# Patient Record
Sex: FEMALE | Race: WHITE | NOT HISPANIC OR LATINO | Employment: FULL TIME | URBAN - METROPOLITAN AREA
[De-identification: names, ages, dates, MRNs, and addresses within clinical notes are randomized per-mention and may not be internally consistent; named-entity substitution may affect disease eponyms.]

---

## 2022-05-03 ENCOUNTER — APPOINTMENT (OUTPATIENT)
Dept: WOMENS IMAGING | Facility: HOSPITAL | Age: 42
End: 2022-05-03

## 2022-05-03 PROCEDURE — 77063 BREAST TOMOSYNTHESIS BI: CPT | Performed by: RADIOLOGY

## 2022-05-03 PROCEDURE — 77067 SCR MAMMO BI INCL CAD: CPT | Performed by: RADIOLOGY

## 2022-09-27 ENCOUNTER — HOSPITAL ENCOUNTER (OUTPATIENT)
Dept: GENERAL RADIOLOGY | Facility: HOSPITAL | Age: 42
Discharge: HOME | End: 2022-09-27

## 2022-09-27 ENCOUNTER — OFFICE VISIT (OUTPATIENT)
Dept: INTERNAL MEDICINE | Facility: CLINIC | Age: 42
End: 2022-09-27

## 2022-09-27 ENCOUNTER — TELEPHONE (OUTPATIENT)
Dept: INTERNAL MEDICINE | Facility: CLINIC | Age: 42
End: 2022-09-27

## 2022-09-27 VITALS
HEIGHT: 60 IN | WEIGHT: 192 LBS | BODY MASS INDEX: 37.69 KG/M2 | HEART RATE: 86 BPM | OXYGEN SATURATION: 98 % | DIASTOLIC BLOOD PRESSURE: 72 MMHG | SYSTOLIC BLOOD PRESSURE: 114 MMHG | TEMPERATURE: 97.3 F

## 2022-09-27 DIAGNOSIS — R00.2 PALPITATIONS: Primary | ICD-10-CM

## 2022-09-27 DIAGNOSIS — Z00.00 ENCOUNTER FOR MEDICAL EXAMINATION TO ESTABLISH CARE: ICD-10-CM

## 2022-09-27 DIAGNOSIS — M25.531 RIGHT WRIST PAIN: ICD-10-CM

## 2022-09-27 DIAGNOSIS — Z11.59 NEED FOR HEPATITIS C SCREENING TEST: ICD-10-CM

## 2022-09-27 DIAGNOSIS — R00.2 PALPITATIONS: ICD-10-CM

## 2022-09-27 DIAGNOSIS — Z13.220 SCREENING FOR LIPID DISORDERS: ICD-10-CM

## 2022-09-27 DIAGNOSIS — E03.9 HYPOTHYROIDISM, UNSPECIFIED TYPE: ICD-10-CM

## 2022-09-27 DIAGNOSIS — Z13.1 SCREENING FOR DIABETES MELLITUS: ICD-10-CM

## 2022-09-27 DIAGNOSIS — Z00.00 ANNUAL PHYSICAL EXAM: ICD-10-CM

## 2022-09-27 DIAGNOSIS — M25.572 ACUTE LEFT ANKLE PAIN: ICD-10-CM

## 2022-09-27 DIAGNOSIS — E66.01 MORBID (SEVERE) OBESITY DUE TO EXCESS CALORIES: ICD-10-CM

## 2022-09-27 PROCEDURE — 73610 X-RAY EXAM OF ANKLE: CPT

## 2022-09-27 PROCEDURE — 99213 OFFICE O/P EST LOW 20 MIN: CPT | Performed by: NURSE PRACTITIONER

## 2022-09-27 PROCEDURE — 99386 PREV VISIT NEW AGE 40-64: CPT | Performed by: NURSE PRACTITIONER

## 2022-09-27 PROCEDURE — 73110 X-RAY EXAM OF WRIST: CPT

## 2022-09-27 RX ORDER — VALACYCLOVIR HYDROCHLORIDE 500 MG/1
TABLET, FILM COATED ORAL AS NEEDED
COMMUNITY

## 2022-09-27 RX ORDER — LEVOTHYROXINE SODIUM 0.05 MG/1
50 TABLET ORAL DAILY
COMMUNITY
End: 2023-01-09 | Stop reason: SDUPTHER

## 2022-09-27 NOTE — TELEPHONE ENCOUNTER
Regarding Endocrinology Referral :    Velia Barnhart  is not taking new pt, tried to schedule with someone else, pt wanted us to give you a PCP  Call to see if there was another provider suggested.

## 2022-09-27 NOTE — PROGRESS NOTES
"Chief Complaint  Annual PE    Subjective        Brianne Mendoza presents to [facility]  History of Present Illness    New patient and here for her Annual PE.   Patient recently moved here from [place].   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         Patient also concerned about a Fall on [employer] x 06/2022 and has injured her R wrist and L ankle. Still having pain.     Eats fairly well, movement with  Walking (30/45 minutes a day).   Concerned she is not losing weight like she has in the past.     Hx of palpitations and hypothyroidism.   Referrals placed per patient request.     UTD on dental but not vision.     Objective   Vital Signs:  /72   Pulse 86   Temp 97.3 °F (36.3 °C)   Ht 152.4 cm (60\")   Wt 87.1 kg (192 lb)   SpO2 98%   BMI 37.50 kg/m²   Estimated body mass index is 37.5 kg/m² as calculated from the following:    Height as of this encounter: 152.4 cm (60\").    Weight as of this encounter: 87.1 kg (192 lb).    Class 2 Severe Obesity (BMI >=35 and <=39.9). Obesity-related health conditions include the following: none. Obesity is unchanged. BMI is is above average; BMI management plan is completed. We discussed low calorie, low carb based diet program, portion control, increasing exercise, joining a fitness center or start home based exercise program, Weight Watchers or other Commercial based weight reduction program, decreasing alcohol consumption and an karri-based approach such as [employer] Pal or Lose It.      Physical Exam  Vitals and nursing note reviewed.   Constitutional:       Appearance: Normal appearance. She is obese.   HENT:      Head: Normocephalic.      Right Ear: Tympanic membrane, ear canal and external ear normal. There is no impacted cerumen.      Left Ear: Tympanic membrane, ear canal and external ear normal. There is no impacted cerumen.      Mouth/Throat:      Mouth: Mucous membranes are moist.   Eyes: "      Pupils: Pupils are equal, round, and reactive to light.   Cardiovascular:      Rate and Rhythm: Normal rate and regular rhythm.      Pulses: Normal pulses.      Heart sounds: Normal heart sounds.      Comments: No peripheral edema noted.   Pulmonary:      Effort: Pulmonary effort is normal. No respiratory distress.      Breath sounds: Normal breath sounds. No stridor. No wheezing, rhonchi or rales.   Chest:      Chest wall: No tenderness.   Musculoskeletal:         General: Signs of injury present. Normal range of motion.      Comments: C/o pain with movement/pressure to R wrist and L ankle after a fall x 3 months ago.    Skin:     General: Skin is warm.      Capillary Refill: Capillary refill takes less than 2 seconds.   Neurological:      Mental Status: She is alert and oriented to person, place, and time.   Psychiatric:         Behavior: Behavior normal.        Result Review :                  Assessment and Plan   Diagnoses and all orders for this visit:    1. Palpitations (Primary)  -     Ambulatory Referral to Cardiology  -     Cancel: CBC & Differential; Future  -     Cancel: Comprehensive Metabolic Panel; Future  -     Cancel: Hemoglobin A1c; Future  -     Cancel: Lipid Panel; Future  -     Cancel: TSH Rfx On Abnormal To Free T4; Future  -     TSH Rfx On Abnormal To Free T4; Future  -     Lipid Panel; Future  -     Hemoglobin A1c; Future  -     Comprehensive Metabolic Panel; Future  -     CBC & Differential; Future    2. Annual physical exam  -     Cancel: CBC & Differential; Future  -     Cancel: Comprehensive Metabolic Panel; Future  -     Cancel: Hemoglobin A1c; Future  -     Cancel: Lipid Panel; Future  -     Cancel: TSH Rfx On Abnormal To Free T4; Future  -     TSH Rfx On Abnormal To Free T4; Future  -     Lipid Panel; Future  -     Hemoglobin A1c; Future  -     Comprehensive Metabolic Panel; Future  -     CBC & Differential; Future    3. Hypothyroidism, unspecified type  -     Ambulatory Referral  to Endocrinology  -     Cancel: CBC & Differential; Future  -     Cancel: Comprehensive Metabolic Panel; Future  -     Cancel: Hemoglobin A1c; Future  -     Cancel: Lipid Panel; Future  -     Cancel: TSH Rfx On Abnormal To Free T4; Future  -     TSH Rfx On Abnormal To Free T4; Future  -     Lipid Panel; Future  -     Hemoglobin A1c; Future  -     Comprehensive Metabolic Panel; Future  -     CBC & Differential; Future    4. Right wrist pain  -     XR Wrist 3+ View Right; Future  -     Cancel: CBC & Differential; Future  -     Cancel: Comprehensive Metabolic Panel; Future  -     Cancel: Hemoglobin A1c; Future  -     Cancel: Lipid Panel; Future  -     Cancel: TSH Rfx On Abnormal To Free T4; Future  -     TSH Rfx On Abnormal To Free T4; Future  -     Lipid Panel; Future  -     Hemoglobin A1c; Future  -     Comprehensive Metabolic Panel; Future  -     CBC & Differential; Future    5. Acute left ankle pain  -     XR Ankle 3+ View Left; Future  -     Cancel: CBC & Differential; Future  -     Cancel: Comprehensive Metabolic Panel; Future  -     Cancel: Hemoglobin A1c; Future  -     Cancel: Lipid Panel; Future  -     Cancel: TSH Rfx On Abnormal To Free T4; Future  -     TSH Rfx On Abnormal To Free T4; Future  -     Lipid Panel; Future  -     Hemoglobin A1c; Future  -     Comprehensive Metabolic Panel; Future  -     CBC & Differential; Future    6. Encounter for medical examination to establish care  -     Cancel: CBC & Differential; Future  -     Cancel: Comprehensive Metabolic Panel; Future  -     Cancel: Hemoglobin A1c; Future  -     Cancel: Lipid Panel; Future  -     Cancel: TSH Rfx On Abnormal To Free T4; Future  -     TSH Rfx On Abnormal To Free T4; Future  -     Lipid Panel; Future  -     Hemoglobin A1c; Future  -     Comprehensive Metabolic Panel; Future  -     CBC & Differential; Future    7. Screening for lipid disorders  -     Cancel: CBC & Differential; Future  -     Cancel: Comprehensive Metabolic Panel;  Future  -     Cancel: Hemoglobin A1c; Future  -     Cancel: Lipid Panel; Future  -     Cancel: TSH Rfx On Abnormal To Free T4; Future  -     TSH Rfx On Abnormal To Free T4; Future  -     Lipid Panel; Future  -     Hemoglobin A1c; Future  -     Comprehensive Metabolic Panel; Future  -     CBC & Differential; Future    8. Screening for diabetes mellitus  -     Cancel: CBC & Differential; Future  -     Cancel: Comprehensive Metabolic Panel; Future  -     Cancel: Hemoglobin A1c; Future  -     Cancel: Lipid Panel; Future  -     Cancel: TSH Rfx On Abnormal To Free T4; Future  -     TSH Rfx On Abnormal To Free T4; Future  -     Lipid Panel; Future  -     Hemoglobin A1c; Future  -     Comprehensive Metabolic Panel; Future  -     CBC & Differential; Future    9. Need for hepatitis C screening test  -     Cancel: Hepatitis C antibody; Future  -     Hepatitis C antibody; Future    10. Morbid (severe) obesity due to excess calories (HCC)      Labs today, we will contact her with those results.   She will obtain Xray's of the R wrist and L ankle today and I will call her with those results.   She will schedule her appointments with specialist at this time.   Eye Exam needed.          Follow Up   Return in about 1 year (around 9/27/2023) for Annual physical.  Patient was given instructions and counseling regarding her condition or for health maintenance advice. Please see specific information pulled into the AVS if appropriate.

## 2022-09-28 LAB
ALBUMIN SERPL-MCNC: 4.7 G/DL (ref 3.5–5.2)
ALBUMIN/GLOB SERPL: 2 G/DL
ALP SERPL-CCNC: 90 U/L (ref 39–117)
ALT SERPL-CCNC: 28 U/L (ref 1–33)
AST SERPL-CCNC: 21 U/L (ref 1–32)
BASOPHILS # BLD AUTO: 0.06 10*3/MM3 (ref 0–0.2)
BASOPHILS NFR BLD AUTO: 1.1 % (ref 0–1.5)
BILIRUB SERPL-MCNC: 0.5 MG/DL (ref 0–1.2)
BUN SERPL-MCNC: 11 MG/DL (ref 6–20)
BUN/CREAT SERPL: 15.5 (ref 7–25)
CALCIUM SERPL-MCNC: 9.6 MG/DL (ref 8.6–10.5)
CHLORIDE SERPL-SCNC: 101 MMOL/L (ref 98–107)
CHOLEST SERPL-MCNC: 266 MG/DL (ref 0–200)
CO2 SERPL-SCNC: 27.7 MMOL/L (ref 22–29)
CREAT SERPL-MCNC: 0.71 MG/DL (ref 0.57–1)
EGFRCR SERPLBLD CKD-EPI 2021: 110.4 ML/MIN/1.73
EOSINOPHIL # BLD AUTO: 0.19 10*3/MM3 (ref 0–0.4)
EOSINOPHIL NFR BLD AUTO: 3.6 % (ref 0.3–6.2)
ERYTHROCYTE [DISTWIDTH] IN BLOOD BY AUTOMATED COUNT: 12.6 % (ref 12.3–15.4)
GLOBULIN SER CALC-MCNC: 2.3 GM/DL
GLUCOSE SERPL-MCNC: 88 MG/DL (ref 65–99)
HBA1C MFR BLD: 5.2 % (ref 4.8–5.6)
HCT VFR BLD AUTO: 39.7 % (ref 34–46.6)
HCV AB S/CO SERPL IA: <0.1 S/CO RATIO (ref 0–0.9)
HDLC SERPL-MCNC: 50 MG/DL (ref 40–60)
HGB BLD-MCNC: 13.4 G/DL (ref 12–15.9)
IMM GRANULOCYTES # BLD AUTO: 0.02 10*3/MM3 (ref 0–0.05)
IMM GRANULOCYTES NFR BLD AUTO: 0.4 % (ref 0–0.5)
LDLC SERPL CALC-MCNC: 170 MG/DL (ref 0–100)
LYMPHOCYTES # BLD AUTO: 1.8 10*3/MM3 (ref 0.7–3.1)
LYMPHOCYTES NFR BLD AUTO: 33.7 % (ref 19.6–45.3)
MCH RBC QN AUTO: 31.2 PG (ref 26.6–33)
MCHC RBC AUTO-ENTMCNC: 33.8 G/DL (ref 31.5–35.7)
MCV RBC AUTO: 92.3 FL (ref 79–97)
MONOCYTES # BLD AUTO: 0.3 10*3/MM3 (ref 0.1–0.9)
MONOCYTES NFR BLD AUTO: 5.6 % (ref 5–12)
NEUTROPHILS # BLD AUTO: 2.97 10*3/MM3 (ref 1.7–7)
NEUTROPHILS NFR BLD AUTO: 55.6 % (ref 42.7–76)
NRBC BLD AUTO-RTO: 0 /100 WBC (ref 0–0.2)
PLATELET # BLD AUTO: 266 10*3/MM3 (ref 140–450)
POTASSIUM SERPL-SCNC: 4.5 MMOL/L (ref 3.5–5.2)
PROT SERPL-MCNC: 7 G/DL (ref 6–8.5)
RBC # BLD AUTO: 4.3 10*6/MM3 (ref 3.77–5.28)
SODIUM SERPL-SCNC: 139 MMOL/L (ref 136–145)
TRIGL SERPL-MCNC: 246 MG/DL (ref 0–150)
TSH SERPL DL<=0.005 MIU/L-ACNC: 1.45 UIU/ML (ref 0.27–4.2)
VLDLC SERPL CALC-MCNC: 46 MG/DL (ref 5–40)
WBC # BLD AUTO: 5.34 10*3/MM3 (ref 3.4–10.8)

## 2022-09-28 NOTE — PROGRESS NOTES
Hello, hope you are well today.   I wanted to make sure you are talking about the R wrist that is normal? Thank you, Roslyn.

## 2022-09-30 ENCOUNTER — PATIENT ROUNDING (BHMG ONLY) (OUTPATIENT)
Dept: INTERNAL MEDICINE | Facility: CLINIC | Age: 42
End: 2022-09-30

## 2022-09-30 NOTE — PROGRESS NOTES
September 30, 2022    Hello, may I speak with Brianne Mendoza?    My name is joi    I am  with [facility]  [address]  [place] [address] [phone].    Before we get started may I verify your date of birth? 1980    I am calling to officially welcome you to our practice and ask about your recent visit. Is this a good time to talk? yes  Tell me about your visit with us. What things went well?  everything was good     We're always looking for ways to make our patients' experiences even better. Do you have recommendations on ways we may improve? no   Overall were you satisfied with your first visit to our practice? yes     I appreciate you taking the time to speak with me today. Is there anything else I can do for you? I do not think so thank you     Thank you, and have a great day.

## 2022-11-04 ENCOUNTER — OFFICE VISIT (OUTPATIENT)
Dept: CARDIOLOGY | Facility: CLINIC | Age: 42
End: 2022-11-04

## 2022-11-04 VITALS
HEART RATE: 74 BPM | HEIGHT: 60 IN | OXYGEN SATURATION: 98 % | BODY MASS INDEX: 37.89 KG/M2 | DIASTOLIC BLOOD PRESSURE: 80 MMHG | RESPIRATION RATE: 18 BRPM | WEIGHT: 193 LBS | SYSTOLIC BLOOD PRESSURE: 124 MMHG

## 2022-11-04 DIAGNOSIS — I49.3 PVC (PREMATURE VENTRICULAR CONTRACTION): ICD-10-CM

## 2022-11-04 DIAGNOSIS — R00.2 PALPITATIONS: Primary | ICD-10-CM

## 2022-11-04 DIAGNOSIS — E78.5 HYPERLIPIDEMIA, UNSPECIFIED HYPERLIPIDEMIA TYPE: ICD-10-CM

## 2022-11-04 PROCEDURE — 99204 OFFICE O/P NEW MOD 45 MIN: CPT | Performed by: INTERNAL MEDICINE

## 2022-11-04 PROCEDURE — 93000 ELECTROCARDIOGRAM COMPLETE: CPT | Performed by: INTERNAL MEDICINE

## 2022-11-04 RX ORDER — DILTIAZEM HYDROCHLORIDE 120 MG/1
120 CAPSULE, EXTENDED RELEASE ORAL DAILY
Qty: 90 CAPSULE | Refills: 3 | Status: SHIPPED | OUTPATIENT
Start: 2022-11-04 | End: 2022-12-12

## 2022-11-04 NOTE — PROGRESS NOTES
CARDIOLOGY    Sia Cosme MD    ENCOUNTER DATE:  11/04/2022    Brianne Mendoza / 41 y.o. / female        CHIEF COMPLAINT / REASON FOR OFFICE VISIT     Heart Problem (New Patient: Palpitations )      HISTORY OF PRESENT ILLNESS       HPI    Brianne Mendoza is a 41 y.o. female     This is a nice lady who is being followed in the [facility] in [place]. She was diagnosed with PVCs. She had a burden of up to 6.2% based on one tracing. She was having symptoms of palpitations and getting dizzy and lightheaded. She was placed on diltiazem 120 mg a day and did well on it. She became pregnant and it was recommended that she come off the diltiazem and switch to metoprolol. It sounds like she was evaluated for an ablation, but the electrophysiologist said the palpitations could actually get better during pregnancy and they did. Now she is not taking any medications.     She has some occasional palpitations. She has episodes where she notices her heart is going fast on her Apple watch when she is not doing exertion that should be causing her heart to be in the 100s and that worries her. She was also recently diagnosed with hyperlipidemia. She and her  have been trying to exercise and walk more often and she is not having any exertional issues.       REVIEW OF SYSTEMS     Review of Systems   Constitutional: Negative for chills, fever, weight gain and weight loss.   Cardiovascular: Negative for leg swelling.   Respiratory: Negative for cough, snoring and wheezing.    Hematologic/Lymphatic: Negative for bleeding problem. Does not bruise/bleed easily.   Skin: Negative for color change.   Musculoskeletal: Negative for falls, joint pain and myalgias.   Gastrointestinal: Negative for melena.   Genitourinary: Negative for hematuria.   Neurological: Negative for excessive daytime sleepiness.   Psychiatric/Behavioral: Negative for depression. The patient is nervous/anxious.          VITAL SIGNS     Visit Vitals  BP  "124/80 (BP Location: Right arm, Patient Position: Sitting, Cuff Size: Adult)   Pulse 74   Resp 18   Ht 152.4 cm (60\")   Wt 87.5 kg (193 lb)   SpO2 98%   BMI 37.69 kg/m²         Wt Readings from Last 3 Encounters:   11/04/22 87.5 kg (193 lb)   09/27/22 87.1 kg (192 lb)     Body mass index is 37.69 kg/m².      PHYSICAL EXAMINATION     Constitutional:       General: Not in acute distress.  Neck:      Vascular: No carotid bruit or JVD.   Pulmonary:      Effort: Pulmonary effort is normal.      Breath sounds: Normal breath sounds.   Cardiovascular:      Normal rate. Regular rhythm.      Murmurs: There is no murmur.   Psychiatric:         Mood and Affect: Mood and affect normal.           REVIEWED DATA       ECG 12 Lead    Date/Time: 11/4/2022 8:47 AM  Performed by: Sia Cosme MD  Authorized by: Sia Cosme MD   Comparison: not compared with previous ECG   Previous ECG: no previous ECG available  Rhythm: sinus rhythm  BPM: 74  Conduction: conduction normal  ST Segments: ST segments normal  T Waves: T waves normal    Clinical impression: normal ECG              Lipid Panel    Lipid Panel 9/27/22   Total Cholesterol 266 (A)   Triglycerides 246 (A)   HDL Cholesterol 50   VLDL Cholesterol 46 (A)   LDL Cholesterol  170 (A)   (A) Abnormal value       Comments are available for some flowsheets but are not being displayed.             Lab Results   Component Value Date    GLUCOSE 88 09/27/2022    BUN 11 09/27/2022    CREATININE 0.71 09/27/2022    BCR 15.5 09/27/2022    K 4.5 09/27/2022    CO2 27.7 09/27/2022    CALCIUM 9.6 09/27/2022    PROTENTOTREF 7.0 09/27/2022    ALBUMIN 4.70 09/27/2022    LABIL2 2.0 09/27/2022    AST 21 09/27/2022    ALT 28 09/27/2022       ASSESSMENT & PLAN      Diagnosis Plan   1. Palpitations  Adult Transthoracic Echo Complete W/ Cont if Necessary Per Protocol      2. PVC (premature ventricular contraction)  Adult Transthoracic Echo Complete W/ Cont if Necessary Per Protocol      3. " Hyperlipidemia, unspecified hyperlipidemia type  Adult Transthoracic Echo Complete W/ Cont if Necessary Per Protocol          1.  Occasional PVCs which are symptomatic.  She was previously on Diltiazem and then on metoprolol while she was pregnant.  She is not on anything now but is noticing return of some of the palpitations.  She would like to go back onto the Diltiazem so I have put in a prescription for 120 mg a day and we will see how she does with it.  I also want to recheck an echocardiogram as it has been almost 5 years since her last echocardiogram and she does have these PVCs and was just pregnant.  She will have the echocardiogram when she comes back to follow up with Alessandra in 3 months.     2.  Hyperlipidemia.  I reviewed her lipid panel and I agree that she needs a change of diet and to start more exercise, and recheck the lipids.  I told her that she is in range to require treatment if she is not able to get this under control with diet, exercise and weight loss.    3.  Hypothyroid.  TSH in September was normal.    4.  Anxiety.    She does mention that while she and her  are not trying to get pregnant they are also not trying.  I am not concerned about her being on Diltiazem if she were to become pregnant.      Follow up with Alessandra in 3 months to see how she is doing with the Diltiazem and the palpitations.  She will get an echocardiogram at that time.          Orders Placed This Encounter   Procedures   • ECG 12 Lead     This order was created via procedure documentation     Order Specific Question:   Release to patient     Answer:   Routine Release   • Adult Transthoracic Echo Complete W/ Cont if Necessary Per Protocol     Standing Status:   Future     Standing Expiration Date:   11/4/2023     Order Specific Question:   Reason for exam?     Answer:   Palpitations     Order Specific Question:   Reason for exam?     Answer:   Chest Pain           MEDICATIONS         Discharge Medications           Accurate as of November 4, 2022  8:47 AM. If you have any questions, ask your nurse or doctor.            New Medications      Instructions Start Date   dilTIAZem  MG 24 hr capsule  Commonly known as: DILACOR XR  Started by: Sia Cosme MD   120 mg, Oral, Daily         Continue These Medications      Instructions Start Date   levothyroxine 50 MCG tablet  Commonly known as: SYNTHROID, LEVOTHROID   50 mcg, Oral, Daily      valACYclovir 500 MG tablet  Commonly known as: VALTREX   As Needed               Sia Cosme MD  11/04/22  08:47 EDT    Part of this note may be an electronic transcription/translation of spoken language to printed text using the Dragon dictation system.

## 2022-12-11 ENCOUNTER — PATIENT MESSAGE (OUTPATIENT)
Dept: CARDIOLOGY | Facility: CLINIC | Age: 42
End: 2022-12-11

## 2022-12-12 ENCOUNTER — TELEPHONE (OUTPATIENT)
Dept: CARDIOLOGY | Facility: CLINIC | Age: 42
End: 2022-12-12

## 2022-12-12 RX ORDER — METOPROLOL SUCCINATE 25 MG/1
25 TABLET, EXTENDED RELEASE ORAL DAILY
Qty: 90 TABLET | Refills: 3 | Status: SHIPPED | OUTPATIENT
Start: 2022-12-12

## 2022-12-12 NOTE — TELEPHONE ENCOUNTER
----- Message from Court Polanco MA sent at 12/12/2022  9:49 AM EST -----  Regarding: FW: Beta blocker  Contact: [phone]    ----- Message -----  From: Brianne Mendoza  Sent: 12/11/2022   1:13 PM EST  To: Myla Kasper [facility]  Subject: Beta blocker                                     Hi Dr. Cosme,    I took the channel blocker medication a few times and it didn't feel right to me. Is there anyway I can go back to the beta blocker?     Thanks for your help.

## 2022-12-30 ENCOUNTER — OFFICE VISIT (OUTPATIENT)
Dept: ENDOCRINOLOGY | Age: 42
End: 2022-12-30

## 2022-12-30 ENCOUNTER — PATIENT ROUNDING (BHMG ONLY) (OUTPATIENT)
Dept: ENDOCRINOLOGY | Age: 42
End: 2022-12-30

## 2022-12-30 VITALS
BODY MASS INDEX: 38.87 KG/M2 | HEIGHT: 60 IN | DIASTOLIC BLOOD PRESSURE: 80 MMHG | WEIGHT: 198 LBS | HEART RATE: 80 BPM | SYSTOLIC BLOOD PRESSURE: 110 MMHG | OXYGEN SATURATION: 98 % | TEMPERATURE: 97.7 F

## 2022-12-30 DIAGNOSIS — E03.9 ACQUIRED HYPOTHYROIDISM: Primary | ICD-10-CM

## 2022-12-30 PROCEDURE — 99204 OFFICE O/P NEW MOD 45 MIN: CPT | Performed by: INTERNAL MEDICINE

## 2022-12-30 NOTE — PROGRESS NOTES
"Chief Complaint  Chief Complaint   Patient presents with   • Hypothyroidism     Pt states that energy levels have been good, weight is higher than expected, no family hx of thyroid disease, does have a regular menstrual cycle, last start date was 12.12.22.        Subjective          History of Present Illness    Brianne Mendoza 42 y.o. presents as a new patient for the evaluation of Hypothyroidism. Consulted by  Oksana.     Pt was diagnosed with hypothyroidism about 4 years ago. She was diagnosed with this issue when  She was trying to get pregnant. Starting thyroid medication helped her to conceive in 2021  Takes levothyroxine 50 mcg oral daily, takes on empty stomach.     She reports her energy levels are ok, gained weight of about 25 ponds since summer, planning to start a weight loss program - which is behavior changes. No hair loss, no increased sweating, no dry skin, sleep is ok . c/o heat intolerance and no cold intolerance. No c/o tremors, does c/o racing of heart, does see EP for this ( has PVCs) and no eye symptoms.   Denied c/o difficulty breathing, swallowing and change in voice.   No family hx of thyroid disease.     Menses - LMP - 12/12/2022, she has 1 kid of her own.       Reviewed primary care physician's/consulting physician documentation and lab results         I have reviewed the patient's allergies, medicines, past medical hx, family hx and social hx in detail.    Objective   Vital Signs:   /80   Pulse 80   Temp 97.7 °F (36.5 °C) (Temporal)   Ht 152.4 cm (60\")   Wt 89.8 kg (198 lb)   SpO2 98%   BMI 38.67 kg/m²   Physical Exam   General appearance - no distress  Eyes- anicteric sclera  Ear nose and throat-external ears and nose normal.    Respiratory-normal chest on inspection.  No respiratory distress noted.  Skin-no rashes.  Neuro-alert and oriented x3            Result Review :   The following data was reviewed by: Magnolia Salgado MD on 12/30/2022:  Orders Only on 09/27/2022 "   Component Date Value Ref Range Status   • WBC 09/27/2022 5.34  3.40 - 10.80 10*3/mm3 Final   • RBC 09/27/2022 4.30  3.77 - 5.28 10*6/mm3 Final   • Hemoglobin 09/27/2022 13.4  12.0 - 15.9 g/dL Final   • Hematocrit 09/27/2022 39.7  34.0 - 46.6 % Final   • MCV 09/27/2022 92.3  79.0 - 97.0 fL Final   • MCH 09/27/2022 31.2  26.6 - 33.0 pg Final   • MCHC 09/27/2022 33.8  31.5 - 35.7 g/dL Final   • RDW 09/27/2022 12.6  12.3 - 15.4 % Final   • Platelets 09/27/2022 266  140 - 450 10*3/mm3 Final   • Neutrophil Rel % 09/27/2022 55.6  42.7 - 76.0 % Final   • Lymphocyte Rel % 09/27/2022 33.7  19.6 - 45.3 % Final   • Monocyte Rel % 09/27/2022 5.6  5.0 - 12.0 % Final   • Eosinophil Rel % 09/27/2022 3.6  0.3 - 6.2 % Final   • Basophil Rel % 09/27/2022 1.1  0.0 - 1.5 % Final   • Neutrophils Absolute 09/27/2022 2.97  1.70 - 7.00 10*3/mm3 Final   • Lymphocytes Absolute 09/27/2022 1.80  0.70 - 3.10 10*3/mm3 Final   • Monocytes Absolute 09/27/2022 0.30  0.10 - 0.90 10*3/mm3 Final   • Eosinophils Absolute 09/27/2022 0.19  0.00 - 0.40 10*3/mm3 Final   • Basophils Absolute 09/27/2022 0.06  0.00 - 0.20 10*3/mm3 Final   • Immature Granulocyte Rel % 09/27/2022 0.4  0.0 - 0.5 % Final   • Immature Grans Absolute 09/27/2022 0.02  0.00 - 0.05 10*3/mm3 Final   • nRBC 09/27/2022 0.0  0.0 - 0.2 /100 WBC Final   • Glucose 09/27/2022 88  65 - 99 mg/dL Final   • BUN 09/27/2022 11  6 - 20 mg/dL Final   • Creatinine 09/27/2022 0.71  0.57 - 1.00 mg/dL Final   • EGFR Result 09/27/2022 110.4  >60.0 mL/min/1.73 Final    Comment: National Kidney Foundation and American Society of  Nephrology (ASN) Task Force recommended calculation based  on the Chronic Kidney Disease Epidemiology Collaboration  (CKD-EPI) equation refit without adjustment for race.  GFR Normal >60  Chronic Kidney Disease <60  Kidney Failure <15     • BUN/Creatinine Ratio 09/27/2022 15.5  7.0 - 25.0 Final   • Sodium 09/27/2022 139  136 - 145 mmol/L Final   • Potassium 09/27/2022 4.5  3.5 -  5.2 mmol/L Final   • Chloride 09/27/2022 101  98 - 107 mmol/L Final   • Total CO2 09/27/2022 27.7  22.0 - 29.0 mmol/L Final   • Calcium 09/27/2022 9.6  8.6 - 10.5 mg/dL Final   • Total Protein 09/27/2022 7.0  6.0 - 8.5 g/dL Final   • Albumin 09/27/2022 4.70  3.50 - 5.20 g/dL Final   • Globulin 09/27/2022 2.3  gm/dL Final   • A/G Ratio 09/27/2022 2.0  g/dL Final   • Total Bilirubin 09/27/2022 0.5  0.0 - 1.2 mg/dL Final   • Alkaline Phosphatase 09/27/2022 90  39 - 117 U/L Final   • AST (SGOT) 09/27/2022 21  1 - 32 U/L Final   • ALT (SGPT) 09/27/2022 28  1 - 33 U/L Final   • Hemoglobin A1C 09/27/2022 5.20  4.80 - 5.60 % Final    Comment: Hemoglobin A1C Ranges:  Increased Risk for Diabetes  5.7% to 6.4%  Diabetes                     >= 6.5%  Diabetic Goal                < 7.0%     • Total Cholesterol 09/27/2022 266 (H)  0 - 200 mg/dL Final    Comment: Cholesterol Reference Ranges  (U.S. Department of Health and Human Services ATP III  Classifications)  Desirable          <200 mg/dL  Borderline High    200-239 mg/dL  High Risk          >240 mg/dL  Triglyceride Reference Ranges  (U.S. Department of Health and Human Services ATP III  Classifications)  Normal           <150 mg/dL  Borderline High  150-199 mg/dL  High             200-499 mg/dL  Very High        >500 mg/dL  HDL Reference Ranges  (U.S. Department of Health and Human Services ATP III  Classifications)  Low     <40 mg/dl (major risk factor for CHD)  High    >60 mg/dl ('negative' risk factor for CHD)  LDL Reference Ranges  (U.S. Department of Health and Human Services ATP III  Classifications)  Optimal          <100 mg/dL  Near Optimal     100-129 mg/dL  Borderline High  130-159 mg/dL  High             160-189 mg/dL  Very High        >189 mg/dL     • Triglycerides 09/27/2022 246 (H)  0 - 150 mg/dL Final   • HDL Cholesterol 09/27/2022 50  40 - 60 mg/dL Final   • VLDL Cholesterol Raymundo 09/27/2022 46 (H)  5 - 40 mg/dL Final   • LDL Chol Calc ([facility]) 09/27/2022 170 (H)   0 - 100 mg/dL Final   • TSH 09/27/2022 1.450  0.270 - 4.200 uIU/mL Final   • Hep C Virus Ab 09/27/2022 <0.1  0.0 - 0.9 s/co ratio Final    Comment:                                   Negative:     < 0.8                               Indeterminate: 0.8 - 0.9                                    Positive:     > 0.9   HCV antibody alone does not differentiate between   previous resolved infection and active infection.   The CDC and current clinical guidelines recommend   that a positive HCV antibody result be followed up   with an HCV RNA test to support the diagnosis of   acute HCV infection. [facility] offers Hepatitis C   Virus (HCV) RNA, Diagnosis, DRE (569300) and   Hepatitis C Virus (HCV) Antibody with reflex to   Quantitative Real-time PCR (814175).       Data reviewed: pcp and referral notes       Results Review:    I reviewed the patient's new clinical results.     Assessment and Plan    Problem List Items Addressed This Visit        Other    Hypothyroidism - Primary    Relevant Orders    TSH    T4, Free    T3, Free    Thyroid Peroxidase Antibody    Vitamin B12 & Folate    Vitamin D,25-Hydroxy    TSH    T4, Free    T3, Free    Basic Metabolic Panel    Hemoglobin A1c    Lipid Panel    Vitamin B12 & Folate    Vitamin D,25-Hydroxy     Hypothyroidism-chronic problem  Check thyroid levels  Adjust the dosage of levothyroxine based on the blood work-up  Continue levothyroxine 50 mcg oral daily.    Discussed with the patient to let us know immediately when she gets to know that she is pregnant to further monitor and closely adjusted thyroid levels.    Obesity  Discussed about doing the weight loss program with behavioral changes.  Interpreted the blood work-up/imaging results performed by the primary care/consulting physician -    Refills sent to pharmacy    Follow Up     Patient was given instructions and counseling regarding her condition or for health maintenance advice. Please see specific information pulled into the  "AVS if appropriate.       Thank you for asking me to see your patient, Brianne Mendoza in consultation.         Magnolia Salgado MD  12/30/22      EMR Dragon / transcription disclaimer:     \"Dictated utilizing Dragon dictation\".              "

## 2022-12-31 LAB
25(OH)D3+25(OH)D2 SERPL-MCNC: 22.9 NG/ML (ref 30–100)
FOLATE SERPL-MCNC: 8.48 NG/ML (ref 4.78–24.2)
T3FREE SERPL-MCNC: 2.8 PG/ML (ref 2–4.4)
T4 FREE SERPL-MCNC: 0.96 NG/DL (ref 0.93–1.7)
THYROPEROXIDASE AB SERPL-ACNC: <9 IU/ML (ref 0–34)
TSH SERPL DL<=0.005 MIU/L-ACNC: 2.28 UIU/ML (ref 0.27–4.2)
VIT B12 SERPL-MCNC: 316 PG/ML (ref 211–946)

## 2023-01-09 ENCOUNTER — TELEPHONE (OUTPATIENT)
Dept: ENDOCRINOLOGY | Age: 43
End: 2023-01-09

## 2023-01-09 RX ORDER — LEVOTHYROXINE SODIUM 0.05 MG/1
50 TABLET ORAL DAILY
Qty: 90 TABLET | Refills: 1 | Status: SHIPPED | OUTPATIENT
Start: 2023-01-09 | End: 2023-01-10 | Stop reason: SDUPTHER

## 2023-01-10 DIAGNOSIS — E03.9 ACQUIRED HYPOTHYROIDISM: Primary | ICD-10-CM

## 2023-01-10 RX ORDER — LEVOTHYROXINE SODIUM 0.05 MG/1
50 TABLET ORAL DAILY
Qty: 90 TABLET | Refills: 2 | Status: SHIPPED | OUTPATIENT
Start: 2023-01-10 | End: 2023-01-11 | Stop reason: SDUPTHER

## 2023-01-11 DIAGNOSIS — E03.9 ACQUIRED HYPOTHYROIDISM: Primary | ICD-10-CM

## 2023-01-11 RX ORDER — LEVOTHYROXINE SODIUM 0.05 MG/1
50 TABLET ORAL DAILY
Qty: 90 TABLET | Refills: 2 | Status: SHIPPED | OUTPATIENT
Start: 2023-01-11

## 2023-02-10 ENCOUNTER — TELEPHONE (OUTPATIENT)
Dept: CARDIOLOGY | Facility: CLINIC | Age: 43
End: 2023-02-10

## 2023-02-10 ENCOUNTER — OFFICE VISIT (OUTPATIENT)
Dept: CARDIOLOGY | Facility: CLINIC | Age: 43
End: 2023-02-10
Payer: COMMERCIAL

## 2023-02-10 ENCOUNTER — HOSPITAL ENCOUNTER (OUTPATIENT)
Dept: CARDIOLOGY | Facility: HOSPITAL | Age: 43
Discharge: HOME | End: 2023-02-10
Payer: COMMERCIAL

## 2023-02-10 ENCOUNTER — LAB (OUTPATIENT)
Dept: LAB | Facility: HOSPITAL | Age: 43
End: 2023-02-10
Payer: COMMERCIAL

## 2023-02-10 VITALS
WEIGHT: 190 LBS | DIASTOLIC BLOOD PRESSURE: 60 MMHG | HEIGHT: 60 IN | OXYGEN SATURATION: 98 % | BODY MASS INDEX: 37.3 KG/M2 | HEART RATE: 66 BPM | SYSTOLIC BLOOD PRESSURE: 100 MMHG

## 2023-02-10 DIAGNOSIS — E78.5 HYPERLIPIDEMIA, UNSPECIFIED HYPERLIPIDEMIA TYPE: ICD-10-CM

## 2023-02-10 DIAGNOSIS — I49.3 PVC (PREMATURE VENTRICULAR CONTRACTION): Primary | ICD-10-CM

## 2023-02-10 DIAGNOSIS — E55.9 VITAMIN D DEFICIENCY: ICD-10-CM

## 2023-02-10 DIAGNOSIS — I49.3 PVC (PREMATURE VENTRICULAR CONTRACTION): ICD-10-CM

## 2023-02-10 DIAGNOSIS — R00.2 PALPITATIONS: ICD-10-CM

## 2023-02-10 LAB
25(OH)D3 SERPL-MCNC: 32 NG/ML (ref 30–100)
ALBUMIN SERPL-MCNC: 4.9 G/DL (ref 3.5–5.2)
ALBUMIN/GLOB SERPL: 1.7 G/DL
ALP SERPL-CCNC: 77 U/L (ref 39–117)
ALT SERPL W P-5'-P-CCNC: 31 U/L (ref 1–33)
ANION GAP SERPL CALCULATED.3IONS-SCNC: 7.4 MMOL/L (ref 5–15)
AORTIC ARCH: 2.3 CM
ASCENDING AORTA: 2.7 CM
AST SERPL-CCNC: 20 U/L (ref 1–32)
BH CV ECHO MEAS - ACS: 1.84 CM
BH CV ECHO MEAS - AO MAX PG: 8.1 MMHG
BH CV ECHO MEAS - AO MEAN PG: 4 MMHG
BH CV ECHO MEAS - AO ROOT DIAM: 2.7 CM
BH CV ECHO MEAS - AO V2 MAX: 142 CM/SEC
BH CV ECHO MEAS - AO V2 VTI: 29.5 CM
BH CV ECHO MEAS - AVA(I,D): 1.43 CM2
BH CV ECHO MEAS - EDV(CUBED): 50.9 ML
BH CV ECHO MEAS - EDV(MOD-SP2): 51 ML
BH CV ECHO MEAS - EDV(MOD-SP4): 52 ML
BH CV ECHO MEAS - EF(MOD-BP): 64.5 %
BH CV ECHO MEAS - EF(MOD-SP2): 66.7 %
BH CV ECHO MEAS - EF(MOD-SP4): 65.4 %
BH CV ECHO MEAS - ESV(CUBED): 9.7 ML
BH CV ECHO MEAS - ESV(MOD-SP2): 17 ML
BH CV ECHO MEAS - ESV(MOD-SP4): 18 ML
BH CV ECHO MEAS - FS: 42.5 %
BH CV ECHO MEAS - IVS/LVPW: 1.04 CM
BH CV ECHO MEAS - IVSD: 0.91 CM
BH CV ECHO MEAS - LAT PEAK E' VEL: 13.2 CM/SEC
BH CV ECHO MEAS - LV DIASTOLIC VOL/BSA (35-75): 28 CM2
BH CV ECHO MEAS - LV MASS(C)D: 96.4 GRAMS
BH CV ECHO MEAS - LV MAX PG: 4.2 MMHG
BH CV ECHO MEAS - LV MEAN PG: 2.22 MMHG
BH CV ECHO MEAS - LV SYSTOLIC VOL/BSA (12-30): 9.7 CM2
BH CV ECHO MEAS - LV V1 MAX: 102 CM/SEC
BH CV ECHO MEAS - LV V1 VTI: 19.6 CM
BH CV ECHO MEAS - LVIDD: 3.7 CM
BH CV ECHO MEAS - LVIDS: 2.13 CM
BH CV ECHO MEAS - LVOT AREA: 2.15 CM2
BH CV ECHO MEAS - LVOT DIAM: 1.65 CM
BH CV ECHO MEAS - LVPWD: 0.88 CM
BH CV ECHO MEAS - MED PEAK E' VEL: 12.3 CM/SEC
BH CV ECHO MEAS - MR MAX PG: 28.8 MMHG
BH CV ECHO MEAS - MR MAX VEL: 268.2 CM/SEC
BH CV ECHO MEAS - MV A DUR: 0.11 SEC
BH CV ECHO MEAS - MV A MAX VEL: 74.6 CM/SEC
BH CV ECHO MEAS - MV DEC SLOPE: 292.3 CM/SEC2
BH CV ECHO MEAS - MV DEC TIME: 0.14 MSEC
BH CV ECHO MEAS - MV E MAX VEL: 93.8 CM/SEC
BH CV ECHO MEAS - MV E/A: 1.26
BH CV ECHO MEAS - MV MAX PG: 4.3 MMHG
BH CV ECHO MEAS - MV MEAN PG: 1.78 MMHG
BH CV ECHO MEAS - MV P1/2T: 106.6 MSEC
BH CV ECHO MEAS - MV V2 VTI: 32 CM
BH CV ECHO MEAS - MVA(P1/2T): 2.06 CM2
BH CV ECHO MEAS - MVA(VTI): 1.32 CM2
BH CV ECHO MEAS - PA ACC TIME: 0.14 SEC
BH CV ECHO MEAS - PA PR(ACCEL): 16 MMHG
BH CV ECHO MEAS - PA V2 MAX: 98.8 CM/SEC
BH CV ECHO MEAS - PI END-D VEL: 82.5 CM/SEC
BH CV ECHO MEAS - QP/QS: 0.87
BH CV ECHO MEAS - RAP SYSTOLE: 3 MMHG
BH CV ECHO MEAS - RV MAX PG: 1.4 MMHG
BH CV ECHO MEAS - RV V1 MAX: 59.1 CM/SEC
BH CV ECHO MEAS - RV V1 VTI: 13 CM
BH CV ECHO MEAS - RVOT DIAM: 1.9 CM
BH CV ECHO MEAS - RVSP: 16 MMHG
BH CV ECHO MEAS - SI(MOD-SP2): 18.3 ML/M2
BH CV ECHO MEAS - SI(MOD-SP4): 18.3 ML/M2
BH CV ECHO MEAS - SUP REN AO DIAM: 1.6 CM
BH CV ECHO MEAS - SV(LVOT): 42.1 ML
BH CV ECHO MEAS - SV(MOD-SP2): 34 ML
BH CV ECHO MEAS - SV(MOD-SP4): 34 ML
BH CV ECHO MEAS - SV(RVOT): 36.6 ML
BH CV ECHO MEAS - TAPSE (>1.6): 1.47 CM
BH CV ECHO MEAS - TR MAX PG: 13.1 MMHG
BH CV ECHO MEAS - TR MAX VEL: 181 CM/SEC
BH CV ECHO MEASUREMENTS AVERAGE E/E' RATIO: 7.36
BH CV XLRA - RV BASE: 2.38 CM
BH CV XLRA - RV LENGTH: 6.1 CM
BH CV XLRA - RV MID: 1.67 CM
BH CV XLRA - TDI S': 11.6 CM/SEC
BILIRUB SERPL-MCNC: 0.4 MG/DL (ref 0–1.2)
BUN SERPL-MCNC: 10 MG/DL (ref 6–20)
BUN/CREAT SERPL: 13 (ref 7–25)
CALCIUM SPEC-SCNC: 9.6 MG/DL (ref 8.6–10.5)
CHLORIDE SERPL-SCNC: 103 MMOL/L (ref 98–107)
CHOLEST SERPL-MCNC: 245 MG/DL (ref 0–200)
CO2 SERPL-SCNC: 28.6 MMOL/L (ref 22–29)
CREAT SERPL-MCNC: 0.77 MG/DL (ref 0.57–1)
EGFRCR SERPLBLD CKD-EPI 2021: 99.5 ML/MIN/1.73
GLOBULIN UR ELPH-MCNC: 2.9 GM/DL
GLUCOSE SERPL-MCNC: 92 MG/DL (ref 65–99)
HDLC SERPL-MCNC: 45 MG/DL (ref 40–60)
LDLC SERPL CALC-MCNC: 177 MG/DL (ref 0–100)
LDLC/HDLC SERPL: 3.89 {RATIO}
LEFT ATRIUM VOLUME INDEX: 14 ML/M2
MAXIMAL PREDICTED HEART RATE: 179 BPM
POTASSIUM SERPL-SCNC: 4.3 MMOL/L (ref 3.5–5.2)
PROT SERPL-MCNC: 7.8 G/DL (ref 6–8.5)
SINUS: 2.8 CM
SODIUM SERPL-SCNC: 139 MMOL/L (ref 136–145)
STJ: 1.83 CM
STRESS TARGET HR: 152 BPM
TRIGL SERPL-MCNC: 125 MG/DL (ref 0–150)
VLDLC SERPL-MCNC: 23 MG/DL (ref 5–40)

## 2023-02-10 PROCEDURE — 80061 LIPID PANEL: CPT

## 2023-02-10 PROCEDURE — 93306 TTE W/DOPPLER COMPLETE: CPT

## 2023-02-10 PROCEDURE — 93306 TTE W/DOPPLER COMPLETE: CPT | Performed by: INTERNAL MEDICINE

## 2023-02-10 PROCEDURE — 99214 OFFICE O/P EST MOD 30 MIN: CPT | Performed by: NURSE PRACTITIONER

## 2023-02-10 PROCEDURE — 82306 VITAMIN D 25 HYDROXY: CPT

## 2023-02-10 PROCEDURE — 80053 COMPREHEN METABOLIC PANEL: CPT

## 2023-02-10 PROCEDURE — 36415 COLL VENOUS BLD VENIPUNCTURE: CPT

## 2023-02-10 NOTE — TELEPHONE ENCOUNTER
I spoke with Brianne Mendoza and updated pt on results/recommendations from provider.  Pt verbalized understanding and has no further questions at this time.    Thank you,    Susy Sweet, RN  Triage [facility]  02/10/23 10:18 EST

## 2023-02-10 NOTE — PROGRESS NOTES
Date of Office Visit: 02/10/23  Encounter Provider: MARCOS Michele  Place of Service: [facility]  Patient Name: Brianne Mednoza  :1980    Chief Complaint   Patient presents with   • Hyperlipidemia   • Follow-up   • Palpitations   :     HPI: Brianne Mendoza is a 42 y.o. female  with hyperlipidemia, premature ventricular contractions, hypothyroidism and anxiety.  She has family history of coronary artery disease involving her maternal aunt who had a heart attack in her 60s and her maternal grandmother who had a heart attack in her 70s.      She was previously followed in the [facility] system in [place].  She is now followed by Dr. Sia Cosme and I will visit her for the first time and have reviewed her medical record.    She was found to have a PVC burden of 6.5% in May 2019 and had palpitation and dizziness and lightheadedness.  She was treated with diltiazem 120 mg daily but when she became pregnant she came off diltiazem and switch to metoprolol.  She was evaluated for an ablation but her palpitations got better during pregnancy.  She was seen in 2022 and was not on calcium channel blocker or beta-blocker.  She reported increased palpitations and was started back on diltiazem.  She then sent a [employer] message that she took the calcium channel blocker a few times but did not feel right so she was switched to metoprolol succinate 25 mg daily.  She presents today for reassessment and her palpitations are much improved on metoprolol succinate.  These are very sporadic now.  They were daily.  She has history of light snoring but no excessive sleepiness or morning headaches.  She believes her sleep pattern is fine.  She has been exercising 6 days a week doing running and kickboxing.  She is also adjusted her diet with help to get her cholesterol down.  She is lost over 10 pounds in the last 5 weeks.  She is wanting repeat lipid panel today.  No Known  "Allergies        Family and social history reviewed.     Review of Systems   Cardiovascular: Positive for palpitations.     All other systems were reviewed and are negative          Objective:     Vitals:    02/10/23 0807   BP: 100/60   BP Location: Left arm   Patient Position: Sitting   Pulse: 66   SpO2: 98%   Weight: 86.2 kg (190 lb)   Height: 152.4 cm (60\")     Body mass index is 37.11 kg/m².    PHYSICAL EXAM:  Cardiovascular:      Normal rate. Regular rhythm.         Procedures      Current Outpatient Medications   Medication Sig Dispense Refill   • levothyroxine (SYNTHROID, LEVOTHROID) 50 MCG tablet Take 1 tablet by mouth Daily. 90 tablet 2   • metoprolol succinate XL (TOPROL-XL) 25 MG 24 hr tablet Take 1 tablet by mouth Daily. 90 tablet 3   • valACYclovir (VALTREX) 500 MG tablet As Needed.       No current facility-administered medications for this visit.     Assessment:       Diagnosis Plan   1. PVC (premature ventricular contraction)        2. Hyperlipidemia, unspecified hyperlipidemia type  Comprehensive Metabolic Panel    Lipid Panel      3. Vitamin D deficiency  Vitamin D 25 Hydroxy           Orders Placed This Encounter   Procedures   • Comprehensive Metabolic Panel     Standing Status:   Future     Standing Expiration Date:   2/11/2024     Order Specific Question:   Release to patient     Answer:   Routine Release   • Lipid Panel     Standing Status:   Future     Standing Expiration Date:   2/10/2024     Order Specific Question:   Release to patient     Answer:   Routine Release   • Vitamin D 25 Hydroxy     Standing Status:   Future     Standing Expiration Date:   2/10/2024     Order Specific Question:   Release to patient     Answer:   Routine Release         Plan:       1.  42-year-old female with premature ventricular contractions accounting for 6.5% of monitoring.  In 2019.  She did not feel well with diltiazem so she was switched to metoprolol succinate 25 mg daily December 2022.  Continue the " same  -Preliminary echo shows preserved left ventricular systolic function.  We will wait on final interpretation and give her a call on final review  2.  Hyperlipidemia.  Reviewed her last lipid panel September 2022 which shows , triglycerides 246, HDL good at 50 and total cholesterol 266.  I calculated her 10-year risk which is low risk at approximately 1.5% so statin therapy is not recommended.  She has been dieting and exercising more.  She is lost over 10 pounds in the last 5 weeks.  I will check fasting lipid and CMP level today.  3.  Hypothyroidism replacement therapy  4.  Anxiety  5.  History of GERD  6.  History of vitamin D deficiency-she asked me to check her vitamin D level with her blood work today      Follow-up in 1 year and call with new or worsening symptoms            It has been a pleasure to participate in this patient's care.      Thank you,  MARCOS Michele      **I used Dragon to dictate this note:**

## 2023-02-10 NOTE — TELEPHONE ENCOUNTER
Please inform patient that her vitamin D level is normal.  Kidney function and liver function is normal.  Cholesterol shows improved total cholesterol.  Her triglycerides been elevated now within normal range.  Her LDL remains elevated which is likely genetic.  Despite elevated cholesterol calculated her 10-year risk for stroke or heart attack which is low therefore statin therapy is not recommended.  She should continue diet and lifestyle modifications including continue routine exercise.  Let her know that her endocrinologist can also see our blood work and she can further discuss with them at her upcoming appointment in April

## 2023-06-12 ENCOUNTER — OFFICE VISIT (OUTPATIENT)
Dept: ENDOCRINOLOGY | Age: 43
End: 2023-06-12
Payer: COMMERCIAL

## 2023-06-12 VITALS
SYSTOLIC BLOOD PRESSURE: 106 MMHG | HEART RATE: 90 BPM | TEMPERATURE: 96.6 F | DIASTOLIC BLOOD PRESSURE: 80 MMHG | BODY MASS INDEX: 36.52 KG/M2 | WEIGHT: 186 LBS | HEIGHT: 60 IN | OXYGEN SATURATION: 96 %

## 2023-06-12 DIAGNOSIS — E03.9 ACQUIRED HYPOTHYROIDISM: Primary | ICD-10-CM

## 2023-06-12 PROCEDURE — 99213 OFFICE O/P EST LOW 20 MIN: CPT | Performed by: NURSE PRACTITIONER

## 2023-06-12 RX ORDER — LEVOTHYROXINE SODIUM 0.05 MG/1
50 TABLET ORAL DAILY
Qty: 90 TABLET | Refills: 3 | Status: SHIPPED | OUTPATIENT
Start: 2023-06-12

## 2023-06-12 RX ORDER — MULTIVIT-MIN/IRON/FOLIC ACID/K 18-600-40
CAPSULE ORAL
COMMUNITY

## 2023-06-12 NOTE — PROGRESS NOTES
"Chief Complaint  Hypothyroidism    Subjective        Brianne Mendoza presents to [facility] ENDOCRINOLOGY  History of Present Illness    diagnosed with hypothyroidism about 4-5 years ago   Currently on levothyroxine 50mcg daily  No concerns or complaints today    Objective   Vital Signs:  /80   Pulse 90   Temp 96.6 °F (35.9 °C) (Temporal)   Ht 152.4 cm (60\")   Wt 84.4 kg (186 lb)   SpO2 96%   BMI 36.33 kg/m²   Estimated body mass index is 36.33 kg/m² as calculated from the following:    Height as of this encounter: 152.4 cm (60\").    Weight as of this encounter: 84.4 kg (186 lb).             Physical Exam  Vitals reviewed.   Constitutional:       General: She is not in acute distress.  HENT:      Head: Normocephalic and atraumatic.   Cardiovascular:      Rate and Rhythm: Normal rate.   Pulmonary:      Effort: Pulmonary effort is normal. No respiratory distress.   Musculoskeletal:         General: No signs of injury. Normal range of motion.      Cervical back: Normal range of motion and neck supple.   Skin:     General: Skin is warm and dry.   Neurological:      Mental Status: She is alert and oriented to person, place, and time. Mental status is at baseline.   Psychiatric:         Mood and Affect: Mood normal.         Behavior: Behavior normal.         Thought Content: Thought content normal.         Judgment: Judgment normal.      Result Review :  The following data was reviewed by: MARCOS Eisenberg on 06/12/2023:  Common labs          9/27/2022    09:41 2/10/2023    08:46   Common Labs   Glucose 88  92    BUN 11  10    Creatinine 0.71  0.77    Sodium 139  139    Potassium 4.5  4.3    Chloride 101  103    Calcium 9.6  9.6    Total Protein 7.0     Albumin 4.70  4.9    Total Bilirubin 0.5  0.4    Alkaline Phosphatase 90  77    AST (SGOT) 21  20    ALT (SGPT) 28  31    WBC 5.34     Hemoglobin 13.4     Hematocrit 39.7     Platelets 266     Total Cholesterol  245    Total Cholesterol 266  "    Triglycerides 246  125    HDL Cholesterol 50  45    LDL Cholesterol  170  177    Hemoglobin A1C 5.20                    Assessment and Plan   Diagnoses and all orders for this visit:    1. Acquired hypothyroidism (Primary)  -     TSH  -     T4, Free  -     T3, Free  -     levothyroxine (SYNTHROID, LEVOTHROID) 50 MCG tablet; Take 1 tablet by mouth Daily.  Dispense: 90 tablet; Refill: 3             Follow Up   No follow-ups on file.    Labs today  Will see prn    Patient was given instructions and counseling regarding her condition or for health maintenance advice. Please see specific information pulled into the AVS if appropriate.     Velia Barnhart, APRN

## 2023-06-13 LAB
T3FREE SERPL-MCNC: 2.3 PG/ML (ref 2–4.4)
T4 FREE SERPL-MCNC: 1.13 NG/DL (ref 0.93–1.7)
TSH SERPL DL<=0.005 MIU/L-ACNC: 0.87 UIU/ML (ref 0.27–4.2)

## 2023-12-11 RX ORDER — METOPROLOL SUCCINATE 25 MG/1
25 TABLET, EXTENDED RELEASE ORAL DAILY
Qty: 90 TABLET | Refills: 3 | Status: SHIPPED | OUTPATIENT
Start: 2023-12-11

## 2024-01-11 ENCOUNTER — TRANSCRIBE ORDERS (OUTPATIENT)
Dept: ADMINISTRATIVE | Facility: HOSPITAL | Age: 44
End: 2024-01-11
Payer: COMMERCIAL

## 2024-01-11 DIAGNOSIS — Z41.9 SURGERY, ELECTIVE: Primary | ICD-10-CM

## 2024-01-12 ENCOUNTER — HOSPITAL ENCOUNTER (OUTPATIENT)
Facility: HOSPITAL | Age: 44
Setting detail: HOSPITAL OUTPATIENT SURGERY
Discharge: HOME | End: 2024-01-12
Attending: OBSTETRICS & GYNECOLOGY | Admitting: OBSTETRICS & GYNECOLOGY
Payer: COMMERCIAL

## 2024-01-12 ENCOUNTER — ANESTHESIA EVENT (OUTPATIENT)
Dept: PERIOP | Facility: HOSPITAL | Age: 44
End: 2024-01-12
Payer: COMMERCIAL

## 2024-01-12 ENCOUNTER — ANESTHESIA (OUTPATIENT)
Dept: PERIOP | Facility: HOSPITAL | Age: 44
End: 2024-01-12
Payer: COMMERCIAL

## 2024-01-12 ENCOUNTER — HOSPITAL ENCOUNTER (OUTPATIENT)
Dept: ULTRASOUND IMAGING | Facility: HOSPITAL | Age: 44
Discharge: HOME | End: 2024-01-12
Payer: COMMERCIAL

## 2024-01-12 VITALS
TEMPERATURE: 97.6 F | WEIGHT: 172 LBS | HEART RATE: 68 BPM | DIASTOLIC BLOOD PRESSURE: 83 MMHG | SYSTOLIC BLOOD PRESSURE: 126 MMHG | RESPIRATION RATE: 16 BRPM | BODY MASS INDEX: 33.77 KG/M2 | HEIGHT: 60 IN | OXYGEN SATURATION: 100 %

## 2024-01-12 DIAGNOSIS — Z41.9 SURGERY, ELECTIVE: ICD-10-CM

## 2024-01-12 DIAGNOSIS — O02.1 MISSED ABORTION: ICD-10-CM

## 2024-01-12 PROBLEM — O09.529 ADVANCED MATERNAL AGE IN MULTIGRAVIDA: Status: ACTIVE | Noted: 2024-01-12

## 2024-01-12 PROBLEM — O03.9 MISCARRIAGE: Status: ACTIVE | Noted: 2024-01-12

## 2024-01-12 LAB
ABO GROUP BLD: NORMAL
BASOPHILS # BLD AUTO: 0.03 10*3/MM3 (ref 0–0.2)
BASOPHILS NFR BLD AUTO: 0.5 % (ref 0–1.5)
BLD GP AB SCN SERPL QL: NEGATIVE
DEPRECATED RDW RBC AUTO: 39 FL (ref 37–54)
EOSINOPHIL # BLD AUTO: 0.16 10*3/MM3 (ref 0–0.4)
EOSINOPHIL NFR BLD AUTO: 2.5 % (ref 0.3–6.2)
ERYTHROCYTE [DISTWIDTH] IN BLOOD BY AUTOMATED COUNT: 11.7 % (ref 12.3–15.4)
HCT VFR BLD AUTO: 37.3 % (ref 34–46.6)
HGB BLD-MCNC: 12.7 G/DL (ref 12–15.9)
IMM GRANULOCYTES # BLD AUTO: 0.01 10*3/MM3 (ref 0–0.05)
IMM GRANULOCYTES NFR BLD AUTO: 0.2 % (ref 0–0.5)
LYMPHOCYTES # BLD AUTO: 1.98 10*3/MM3 (ref 0.7–3.1)
LYMPHOCYTES NFR BLD AUTO: 31.1 % (ref 19.6–45.3)
MCH RBC QN AUTO: 31.3 PG (ref 26.6–33)
MCHC RBC AUTO-ENTMCNC: 34 G/DL (ref 31.5–35.7)
MCV RBC AUTO: 91.9 FL (ref 79–97)
MONOCYTES # BLD AUTO: 0.37 10*3/MM3 (ref 0.1–0.9)
MONOCYTES NFR BLD AUTO: 5.8 % (ref 5–12)
NEUTROPHILS NFR BLD AUTO: 3.82 10*3/MM3 (ref 1.7–7)
NEUTROPHILS NFR BLD AUTO: 59.9 % (ref 42.7–76)
NRBC BLD AUTO-RTO: 0 /100 WBC (ref 0–0.2)
PLATELET # BLD AUTO: 259 10*3/MM3 (ref 140–450)
PMV BLD AUTO: 9.9 FL (ref 6–12)
RBC # BLD AUTO: 4.06 10*6/MM3 (ref 3.77–5.28)
RH BLD: POSITIVE
T&S EXPIRATION DATE: NORMAL
WBC NRBC COR # BLD AUTO: 6.37 10*3/MM3 (ref 3.4–10.8)

## 2024-01-12 PROCEDURE — 25010000002 PROPOFOL 10 MG/ML EMULSION: Performed by: NURSE ANESTHETIST, CERTIFIED REGISTERED

## 2024-01-12 PROCEDURE — 25010000002 LIDOCAINE 1 % SOLUTION 20 ML VIAL: Performed by: OBSTETRICS & GYNECOLOGY

## 2024-01-12 PROCEDURE — 25010000002 GLYCOPYRROLATE 0.2 MG/ML SOLUTION: Performed by: NURSE ANESTHETIST, CERTIFIED REGISTERED

## 2024-01-12 PROCEDURE — 25010000002 FENTANYL CITRATE (PF) 50 MCG/ML SOLUTION: Performed by: NURSE ANESTHETIST, CERTIFIED REGISTERED

## 2024-01-12 PROCEDURE — 76857 US EXAM PELVIC LIMITED: CPT

## 2024-01-12 PROCEDURE — 88305 TISSUE EXAM BY PATHOLOGIST: CPT | Performed by: OBSTETRICS & GYNECOLOGY

## 2024-01-12 PROCEDURE — 25010000002 MIDAZOLAM PER 1 MG: Performed by: ANESTHESIOLOGY

## 2024-01-12 PROCEDURE — 86901 BLOOD TYPING SEROLOGIC RH(D): CPT | Performed by: ANESTHESIOLOGY

## 2024-01-12 PROCEDURE — 25010000002 KETOROLAC TROMETHAMINE PER 15 MG: Performed by: NURSE ANESTHETIST, CERTIFIED REGISTERED

## 2024-01-12 PROCEDURE — 86850 RBC ANTIBODY SCREEN: CPT | Performed by: ANESTHESIOLOGY

## 2024-01-12 PROCEDURE — 25010000002 ROPIVACAINE PER 1 MG: Performed by: OBSTETRICS & GYNECOLOGY

## 2024-01-12 PROCEDURE — 86900 BLOOD TYPING SEROLOGIC ABO: CPT | Performed by: ANESTHESIOLOGY

## 2024-01-12 PROCEDURE — 85025 COMPLETE CBC W/AUTO DIFF WBC: CPT | Performed by: ANESTHESIOLOGY

## 2024-01-12 PROCEDURE — 25810000003 LACTATED RINGERS PER 1000 ML: Performed by: ANESTHESIOLOGY

## 2024-01-12 RX ORDER — HYDRALAZINE HYDROCHLORIDE 20 MG/ML
5 INJECTION INTRAMUSCULAR; INTRAVENOUS
Status: DISCONTINUED | OUTPATIENT
Start: 2024-01-12 | End: 2024-01-12 | Stop reason: HOSPADM

## 2024-01-12 RX ORDER — IBUPROFEN 800 MG/1
800 TABLET ORAL EVERY 8 HOURS PRN
Qty: 20 TABLET | Refills: 0 | Status: SHIPPED | OUTPATIENT
Start: 2024-01-12

## 2024-01-12 RX ORDER — IPRATROPIUM BROMIDE AND ALBUTEROL SULFATE 2.5; .5 MG/3ML; MG/3ML
3 SOLUTION RESPIRATORY (INHALATION) ONCE AS NEEDED
Status: DISCONTINUED | OUTPATIENT
Start: 2024-01-12 | End: 2024-01-12 | Stop reason: HOSPADM

## 2024-01-12 RX ORDER — FAMOTIDINE 10 MG/ML
20 INJECTION, SOLUTION INTRAVENOUS ONCE
Status: COMPLETED | OUTPATIENT
Start: 2024-01-12 | End: 2024-01-12

## 2024-01-12 RX ORDER — DOXYCYCLINE 100 MG/1
200 CAPSULE ORAL ONCE
Status: COMPLETED | OUTPATIENT
Start: 2024-01-12 | End: 2024-01-12

## 2024-01-12 RX ORDER — GLYCOPYRROLATE 0.2 MG/ML
INJECTION INTRAMUSCULAR; INTRAVENOUS AS NEEDED
Status: DISCONTINUED | OUTPATIENT
Start: 2024-01-12 | End: 2024-01-12 | Stop reason: SURG

## 2024-01-12 RX ORDER — LABETALOL HYDROCHLORIDE 5 MG/ML
5 INJECTION, SOLUTION INTRAVENOUS
Status: DISCONTINUED | OUTPATIENT
Start: 2024-01-12 | End: 2024-01-12 | Stop reason: HOSPADM

## 2024-01-12 RX ORDER — NALOXONE HCL 0.4 MG/ML
0.2 VIAL (ML) INJECTION AS NEEDED
Status: DISCONTINUED | OUTPATIENT
Start: 2024-01-12 | End: 2024-01-12 | Stop reason: HOSPADM

## 2024-01-12 RX ORDER — EPHEDRINE SULFATE 50 MG/ML
5 INJECTION, SOLUTION INTRAVENOUS ONCE AS NEEDED
Status: DISCONTINUED | OUTPATIENT
Start: 2024-01-12 | End: 2024-01-12 | Stop reason: HOSPADM

## 2024-01-12 RX ORDER — PROGESTERONE 100 MG/1
100 CAPSULE ORAL DAILY
COMMUNITY
Start: 2023-12-11 | End: 2024-01-12 | Stop reason: HOSPADM

## 2024-01-12 RX ORDER — SODIUM CHLORIDE 0.9 % (FLUSH) 0.9 %
3 SYRINGE (ML) INJECTION EVERY 12 HOURS SCHEDULED
Status: DISCONTINUED | OUTPATIENT
Start: 2024-01-12 | End: 2024-01-12 | Stop reason: HOSPADM

## 2024-01-12 RX ORDER — SODIUM CHLORIDE 0.9 % (FLUSH) 0.9 %
3-10 SYRINGE (ML) INJECTION AS NEEDED
Status: DISCONTINUED | OUTPATIENT
Start: 2024-01-12 | End: 2024-01-12 | Stop reason: HOSPADM

## 2024-01-12 RX ORDER — HYDROMORPHONE HYDROCHLORIDE 1 MG/ML
0.5 INJECTION, SOLUTION INTRAMUSCULAR; INTRAVENOUS; SUBCUTANEOUS
Status: DISCONTINUED | OUTPATIENT
Start: 2024-01-12 | End: 2024-01-12 | Stop reason: HOSPADM

## 2024-01-12 RX ORDER — LIDOCAINE HYDROCHLORIDE 20 MG/ML
INJECTION, SOLUTION INFILTRATION; PERINEURAL AS NEEDED
Status: DISCONTINUED | OUTPATIENT
Start: 2024-01-12 | End: 2024-01-12 | Stop reason: SURG

## 2024-01-12 RX ORDER — MIDAZOLAM HYDROCHLORIDE 1 MG/ML
1 INJECTION INTRAMUSCULAR; INTRAVENOUS
Status: DISCONTINUED | OUTPATIENT
Start: 2024-01-12 | End: 2024-01-12 | Stop reason: HOSPADM

## 2024-01-12 RX ORDER — KETOROLAC TROMETHAMINE 30 MG/ML
INJECTION, SOLUTION INTRAMUSCULAR; INTRAVENOUS AS NEEDED
Status: DISCONTINUED | OUTPATIENT
Start: 2024-01-12 | End: 2024-01-12 | Stop reason: SURG

## 2024-01-12 RX ORDER — MISOPROSTOL 200 UG/1
200 TABLET ORAL ONCE
COMMUNITY
Start: 2024-01-11 | End: 2024-01-12 | Stop reason: HOSPADM

## 2024-01-12 RX ORDER — FENTANYL CITRATE 50 UG/ML
INJECTION, SOLUTION INTRAMUSCULAR; INTRAVENOUS AS NEEDED
Status: DISCONTINUED | OUTPATIENT
Start: 2024-01-12 | End: 2024-01-12 | Stop reason: SURG

## 2024-01-12 RX ORDER — FENTANYL CITRATE 50 UG/ML
50 INJECTION, SOLUTION INTRAMUSCULAR; INTRAVENOUS
Status: DISCONTINUED | OUTPATIENT
Start: 2024-01-12 | End: 2024-01-12 | Stop reason: HOSPADM

## 2024-01-12 RX ORDER — ONDANSETRON 2 MG/ML
4 INJECTION INTRAMUSCULAR; INTRAVENOUS ONCE AS NEEDED
Status: DISCONTINUED | OUTPATIENT
Start: 2024-01-12 | End: 2024-01-12 | Stop reason: HOSPADM

## 2024-01-12 RX ORDER — PROMETHAZINE HYDROCHLORIDE 25 MG/1
25 TABLET ORAL ONCE AS NEEDED
Status: DISCONTINUED | OUTPATIENT
Start: 2024-01-12 | End: 2024-01-12 | Stop reason: HOSPADM

## 2024-01-12 RX ORDER — FLUMAZENIL 0.1 MG/ML
0.2 INJECTION INTRAVENOUS AS NEEDED
Status: DISCONTINUED | OUTPATIENT
Start: 2024-01-12 | End: 2024-01-12 | Stop reason: HOSPADM

## 2024-01-12 RX ORDER — OXYCODONE AND ACETAMINOPHEN 7.5; 325 MG/1; MG/1
1 TABLET ORAL EVERY 4 HOURS PRN
Status: DISCONTINUED | OUTPATIENT
Start: 2024-01-12 | End: 2024-01-12 | Stop reason: HOSPADM

## 2024-01-12 RX ORDER — FENTANYL CITRATE 50 UG/ML
50 INJECTION, SOLUTION INTRAMUSCULAR; INTRAVENOUS ONCE AS NEEDED
Status: DISCONTINUED | OUTPATIENT
Start: 2024-01-12 | End: 2024-01-12 | Stop reason: HOSPADM

## 2024-01-12 RX ORDER — DROPERIDOL 2.5 MG/ML
0.62 INJECTION, SOLUTION INTRAMUSCULAR; INTRAVENOUS
Status: DISCONTINUED | OUTPATIENT
Start: 2024-01-12 | End: 2024-01-12 | Stop reason: HOSPADM

## 2024-01-12 RX ORDER — PROMETHAZINE HYDROCHLORIDE 25 MG/1
25 SUPPOSITORY RECTAL ONCE AS NEEDED
Status: DISCONTINUED | OUTPATIENT
Start: 2024-01-12 | End: 2024-01-12 | Stop reason: HOSPADM

## 2024-01-12 RX ORDER — MAGNESIUM HYDROXIDE 1200 MG/15ML
LIQUID ORAL AS NEEDED
Status: DISCONTINUED | OUTPATIENT
Start: 2024-01-12 | End: 2024-01-12 | Stop reason: HOSPADM

## 2024-01-12 RX ORDER — LIDOCAINE HYDROCHLORIDE 10 MG/ML
0.5 INJECTION, SOLUTION INFILTRATION; PERINEURAL ONCE AS NEEDED
Status: DISCONTINUED | OUTPATIENT
Start: 2024-01-12 | End: 2024-01-12 | Stop reason: HOSPADM

## 2024-01-12 RX ORDER — HYDROCODONE BITARTRATE AND ACETAMINOPHEN 5; 325 MG/1; MG/1
1 TABLET ORAL ONCE AS NEEDED
Status: COMPLETED | OUTPATIENT
Start: 2024-01-12 | End: 2024-01-12

## 2024-01-12 RX ORDER — PROPOFOL 10 MG/ML
VIAL (ML) INTRAVENOUS AS NEEDED
Status: DISCONTINUED | OUTPATIENT
Start: 2024-01-12 | End: 2024-01-12 | Stop reason: SURG

## 2024-01-12 RX ORDER — DIPHENHYDRAMINE HYDROCHLORIDE 50 MG/ML
12.5 INJECTION INTRAMUSCULAR; INTRAVENOUS
Status: DISCONTINUED | OUTPATIENT
Start: 2024-01-12 | End: 2024-01-12 | Stop reason: HOSPADM

## 2024-01-12 RX ORDER — SODIUM CHLORIDE, SODIUM LACTATE, POTASSIUM CHLORIDE, CALCIUM CHLORIDE 600; 310; 30; 20 MG/100ML; MG/100ML; MG/100ML; MG/100ML
9 INJECTION, SOLUTION INTRAVENOUS CONTINUOUS
Status: DISCONTINUED | OUTPATIENT
Start: 2024-01-12 | End: 2024-01-12 | Stop reason: HOSPADM

## 2024-01-12 RX ADMIN — FAMOTIDINE 20 MG: 10 INJECTION INTRAVENOUS at 13:40

## 2024-01-12 RX ADMIN — GLYCOPYRROLATE 0.2 MG: 0.2 INJECTION INTRAMUSCULAR; INTRAVENOUS at 14:12

## 2024-01-12 RX ADMIN — FENTANYL CITRATE 25 MCG: 50 INJECTION, SOLUTION INTRAMUSCULAR; INTRAVENOUS at 14:14

## 2024-01-12 RX ADMIN — LIDOCAINE HYDROCHLORIDE 10 MG: 20 INJECTION, SOLUTION INFILTRATION; PERINEURAL at 14:12

## 2024-01-12 RX ADMIN — PROPOFOL 150 MCG/KG/MIN: 10 INJECTION, EMULSION INTRAVENOUS at 14:12

## 2024-01-12 RX ADMIN — SODIUM CHLORIDE, POTASSIUM CHLORIDE, SODIUM LACTATE AND CALCIUM CHLORIDE 9 ML/HR: 600; 310; 30; 20 INJECTION, SOLUTION INTRAVENOUS at 13:39

## 2024-01-12 RX ADMIN — PROPOFOL 100 MG: 10 INJECTION, EMULSION INTRAVENOUS at 14:12

## 2024-01-12 RX ADMIN — FENTANYL CITRATE 25 MCG: 50 INJECTION, SOLUTION INTRAMUSCULAR; INTRAVENOUS at 14:16

## 2024-01-12 RX ADMIN — DOXYCYCLINE 200 MG: 100 CAPSULE ORAL at 13:39

## 2024-01-12 RX ADMIN — KETOROLAC TROMETHAMINE 30 MG: 30 INJECTION INTRAMUSCULAR; INTRAVENOUS at 14:30

## 2024-01-12 RX ADMIN — HYDROCODONE BITARTRATE AND ACETAMINOPHEN 1 TABLET: 5; 325 TABLET ORAL at 15:17

## 2024-01-12 RX ADMIN — MIDAZOLAM 1 MG: 1 INJECTION INTRAMUSCULAR; INTRAVENOUS at 14:03

## 2024-01-12 NOTE — OP NOTE
Subjective     Patient Name: Brianne Mendoza  :  1980  MRN:  [id number]      Date of Service:  24      Surgeon: Fern Byrne MD       Pre-operative diagnosis(es): 10.3 wk fetal demise  Acute cervical pain     Post-operative diagnosis(es): Post-Op Diagnosis Codes:   same   Procedure(s): Procedure(s):  SUCTION DILATATION AND CURETTAGE WITH ULTRASOUND AVAILABLE           Anesthesia: Type: Monitored Anesthesia Care       Objective      Operative findings: Blood in vagina. Cvx closed. 10 week uterus. Approp amt poc.... normal post procedure thin endometrial stripe at end of procedure     Specimens removed: Order Name Source Comment Collection Info Order Time   TYPE AND SCREEN Arm, Right  Collected By: Kat Gage RN 2024 12:20 PM     Release to patient   Routine Release        TISSUE PATHOLOGY EXAM Products of Conception Do not release. Mother to gain possession for family burial.  Collected By: Fern Byrne MD 2024  2:38 PM     Release to patient   Routine Release               Complications: none      EBL: minimal    Rh pos      44yo at 12 wk in office with 10.3 wk fetal demise. Does not want chromosomal testing. She wants remains back to her.             Dictation on: 2024  5:27 PM by: FERN BYRNE [[id number]]                                                 Fern Byrne MD  24  15:10 EST

## 2024-01-12 NOTE — OP NOTE
Patient was taken to the operating room where she was given IV sedation.  She was placed in dorsolithotomy position.  She was given a paracervical block, placed using a total of 30 mL of a mixture of 1% lidocaine and 0.5% Naropin.  She was prepped and draped and her bladder was drained.  Ultrasonographer from the ultrasound department was there and we could see this 10-week intrauterine pregnancy.  She was gently dilated to allow a 9 curved rigid suction curette to be advanced in the endometrial cavity under ultrasound guidance.  Suction machine was tested and activated and just got into the yellow zone.  Never could get this machine today into the green zone.  Suction curettage was performed with a normal amount of products of conception.  We watched the uterus decompress and there was removal of the gestational sac as well as the embryo.  A sharp curettage was performed with a #2 curette with a gritty texture noted in all areas of the uterus.  There was a normal postprocedural endometrial stripe and it appeared that everything was removed.  Instruments were removed.  She was brought from anesthesia and taken to seated recovery in good condition.  She is Rh-positive and is on RhoGAM.

## 2024-01-12 NOTE — ANESTHESIA PREPROCEDURE EVALUATION
Anesthesia Evaluation     Patient summary reviewed   no history of anesthetic complications:   NPO Solid Status: > 8 hours  NPO Liquid Status: > 2 hours           Airway   Mallampati: II  TM distance: >3 FB  Neck ROM: full  No difficulty expected  Dental - normal exam     Pulmonary     breath sounds clear to auscultation  (-) shortness of breath, recent URI, not a smoker  Cardiovascular   Exercise tolerance: good (4-7 METS)    ECG reviewed  Patient on routine beta blocker and Beta blocker given within 24 hours of surgery  Rhythm: regular  Rate: normal    (+) dysrhythmias PVC, hyperlipidemia  (-) past MI, angina    ROS comment:   11/2022 TTE- Interpretation Summary  ·  Left ventricular systolic function is normal. Calculated left ventricular EF = 64.5% Normal left ventricular cavity size and wall thickness noted. All left ventricular wall segments contract normally. Left ventricular diastolic function was normal.  ·  Trace mitral valve regurgitation is present.  ·  Trace tricuspid valve regurgitation is present. Estimated right ventricular systolic pressure from tricuspid regurgitation is normal (<35 mmHg). Calculated right ventricular systolic pressure from tricuspid regurgitation is 16 mmHg.         Neuro/Psych  (+) psychiatric history Anxiety  (-) seizures, CVA  GI/Hepatic/Renal/Endo    (+) obesity, GERD, renal disease (h/o)- stones, thyroid problem hypothyroidism  (-) diabetes    Musculoskeletal     (-) neck stiffness  Abdominal    Substance History      OB/GYN          Other                          Anesthesia Plan    ASA 2     MAC     (MAC anesthesia discussed with patient and/or patient representative. Risks (including but not limited to intra-op awareness), benefits, and alternatives were discussed. Understanding was voiced with an agreement to proceed with a MAC technique and General as a backup option. )    Anesthetic plan, risks, benefits, and alternatives have been provided, discussed and informed  consent has been obtained with: patient.    Use of blood products discussed with patient .        CODE STATUS:

## 2024-01-12 NOTE — ANESTHESIA POSTPROCEDURE EVALUATION
"Patient: Brianne Mendoza    Procedure Summary       Date: 01/12/24 Room / Location: [facility] OR  / [facility] MAIN OR    Anesthesia Start: 1408 Anesthesia Stop: 1450    Procedure: SUCTION DILATATION AND CURETTAGE WITH ULTRASOUND AVAILABLE (Vagina) Diagnosis:     Surgeons: Rubi Jara MD Provider: Sujata Meyer MD    Anesthesia Type: MAC ASA Status: 2            Anesthesia Type: MAC    Vitals  Vitals Value Taken Time   BP     Temp     Pulse 84 01/12/24 1453   Resp     SpO2 98 % 01/12/24 1453   Vitals shown include unfiled device data.        Post Anesthesia Care and Evaluation    Level of consciousness: awake and alert  Pain management: adequate    Airway patency: patent  Anesthetic complications: No anesthetic complications  PONV Status: none  Cardiovascular status: acceptable  Respiratory status: acceptable  Hydration status: acceptable    Comments: BP 94/71 (BP Location: Right arm, Patient Position: Lying)   Pulse 66   Temp 37.4 °C (99.4 °F) (Oral)   Resp 15   Ht 152.4 cm (60\")   Wt 78 kg (172 lb)   LMP 10/17/2023 (Exact Date)   SpO2 100%   BMI 33.59 kg/m²       "

## 2024-01-13 LAB
LAB AP CASE REPORT: NORMAL
LAB AP CLINICAL INFORMATION: NORMAL
PATH REPORT.FINAL DX SPEC: NORMAL
PATH REPORT.GROSS SPEC: NORMAL

## 2024-03-08 ENCOUNTER — OFFICE VISIT (OUTPATIENT)
Dept: CARDIOLOGY | Facility: CLINIC | Age: 44
End: 2024-03-08
Payer: COMMERCIAL

## 2024-03-08 VITALS
SYSTOLIC BLOOD PRESSURE: 128 MMHG | WEIGHT: 161.8 LBS | OXYGEN SATURATION: 98 % | DIASTOLIC BLOOD PRESSURE: 86 MMHG | HEIGHT: 60 IN | BODY MASS INDEX: 31.77 KG/M2 | HEART RATE: 91 BPM

## 2024-03-08 DIAGNOSIS — K92.1 GASTROINTESTINAL HEMORRHAGE WITH MELENA: Primary | ICD-10-CM

## 2024-03-08 DIAGNOSIS — R00.2 PALPITATIONS: ICD-10-CM

## 2024-03-08 DIAGNOSIS — I49.3 PVC (PREMATURE VENTRICULAR CONTRACTION): ICD-10-CM

## 2024-03-08 PROCEDURE — 99214 OFFICE O/P EST MOD 30 MIN: CPT | Performed by: INTERNAL MEDICINE

## 2024-03-08 PROCEDURE — 93000 ELECTROCARDIOGRAM COMPLETE: CPT | Performed by: INTERNAL MEDICINE

## 2024-03-08 RX ORDER — NORETHINDRONE ACETATE AND ETHINYL ESTRADIOL, AND FERROUS FUMARATE 1MG-20(24)
1 KIT ORAL DAILY
COMMUNITY
Start: 2024-02-13

## 2024-05-31 ENCOUNTER — OFFICE VISIT (OUTPATIENT)
Dept: GASTROENTEROLOGY | Facility: CLINIC | Age: 44
End: 2024-05-31
Payer: COMMERCIAL

## 2024-05-31 ENCOUNTER — TELEPHONE (OUTPATIENT)
Dept: GASTROENTEROLOGY | Facility: CLINIC | Age: 44
End: 2024-05-31
Payer: COMMERCIAL

## 2024-05-31 VITALS
HEIGHT: 60 IN | BODY MASS INDEX: 31.8 KG/M2 | TEMPERATURE: 97.3 F | DIASTOLIC BLOOD PRESSURE: 69 MMHG | WEIGHT: 162 LBS | SYSTOLIC BLOOD PRESSURE: 105 MMHG | HEART RATE: 89 BPM

## 2024-05-31 DIAGNOSIS — K62.5 RECTAL BLEEDING: Primary | ICD-10-CM

## 2024-05-31 RX ORDER — ONDANSETRON 4 MG/1
TABLET, FILM COATED ORAL
COMMUNITY
Start: 2024-05-14

## 2024-05-31 NOTE — Clinical Note
43-year-old female with episodic rectal bleeding.  Sending her your way for colonoscopy.  Thanks Larissa

## 2024-05-31 NOTE — PROGRESS NOTES
Chief Complaint   Patient presents with    Rectal Bleeding       HPI    Brianne Mendoza is a  43 y.o. female here to establish care as a new patient for complaints of rectal bleeding.    This patient will also follow with Dr. Ulrich.    Past medical history of anxiety, hyperlipidemia, thyroid disease, palpitations which have been stable on beta-blocker therapy follows with Dr. Cosme [facility] cardiology along with kidney stones.    She was referred to us by Dr. Cosme after she mentioned episodes of rectal bleeding when seen in March.    Patient reports off-and-on rectal bleeding with bright red blood in the stool.  The last time she had rectal bleeding was last week.  This has been an issue for at least 1 year.  Reports fluctuant bowel pattern alternating between firm stools and episodes of diarrhea.  Her bowels move at least every 2 days.  Denies rectal pain.  Appetite is good.  Her weight is stable.  She does report being diagnosed with an anal fissure several years ago treated topically and resolved.  Never had a colonoscopy.  No family history of colon cancer.    She recently tried semaglutide to help her lose weight but stopped after she developed vomiting.  No further issues with vomiting off medication.    She gets occasional heartburn for which she takes over-the-counter PPI therapy.  Symptoms are well-controlled from her perspective.  No current issues with nausea, vomiting, dysphagia or odynophagia.    Past Medical History:   Diagnosis Date    Anxiety     Clotting disorder     Think from hemorroids    GERD (gastroesophageal reflux disease)     Hyperlipidemia     Hypothyroidism 2018    Irregular heart beats     followed by Dr. Cosme.. EKG/ECHO done 2022 on file.    Kidney stone 1998       Past Surgical History:   Procedure Laterality Date     SECTION  2021    D & C WITH SUCTION N/A 2024    Procedure: SUCTION DILATATION AND CURETTAGE WITH ULTRASOUND AVAILABLE;  Surgeon: Rubi Jara  MD NIDIA;  Location: [facility] OR;  Service: Obstetrics/Gynecology;  Laterality: N/A;    KIDNEY STONE SURGERY      LEEP  2018       Scheduled Meds:     Continuous Infusions: No current facility-administered medications for this visit.      PRN Meds:     No Known Allergies    Social History     Socioeconomic History    Marital status:    Tobacco Use    Smoking status: Never    Smokeless tobacco: Never    Tobacco comments:     Caffeine - coffee on occas.    Vaping Use    Vaping status: Never Used   Substance and Sexual Activity    Alcohol use: Yes     Alcohol/week: 6.0 standard drinks of alcohol     Types: 4 Glasses of wine, 2 Drinks containing 0.5 oz of alcohol per week     Comment: 2 -3 days a week    Drug use: Never    Sexual activity: Yes     Partners: Male     Birth control/protection: Birth control pill       Family History   Problem Relation Age of Onset    Diabetes Sister         Type 1    Irritable bowel syndrome Mother     Malig Hyperthermia Neg Hx        Review of Systems   Gastrointestinal:  Positive for anal bleeding.       Vitals:    05/31/24 1410   BP: 105/69   Pulse: 89   Temp: 97.3 °F (36.3 °C)       Physical Exam  Constitutional:       Appearance: She is well-developed.   Abdominal:      General: Bowel sounds are normal. There is no distension.      Palpations: Abdomen is soft. There is no mass.      Tenderness: There is no abdominal tenderness. There is no guarding.      Hernia: No hernia is present.   Genitourinary:     Comments: Patient deferred rectal exam  Skin:     General: Skin is warm and dry.      Capillary Refill: Capillary refill takes less than 2 seconds.   Neurological:      Mental Status: She is alert and oriented to person, place, and time.   Psychiatric:         Behavior: Behavior normal.     Assessment    Diagnoses and all orders for this visit:    1. Rectal bleeding (Primary)  -     Case Request; Standing  -     Case Request  -     CBC (No Diff)  -     C-reactive Protein  -      Sedimentation Rate    Plan    Homa 43-year-old female seen today for episodic rectal bleeding. Based on the patient’s description, the bleeding seems likely anorectal in origin, such as with hemorrhoid, tear, or fissure, but differential diagnosis would include underlying colonic inflammation, polyp, or mass.     As such recommend colonoscopy with Dr. Ulrich for further evaluation of symptoms.  Risk-benefit of procedure reviewed with the patient and all questions were answered.  Obtain an updated CBC along with CRP and sed rate.    Patient to follow-up back in the office after procedure has been completed to discuss results.         MARCOS Mayen  [facility] [place], operated by [facility]  [address] [place] [address]  Office: [phone]

## 2024-05-31 NOTE — TELEPHONE ENCOUNTER
CS SCHEDULED FOR 7-18-24 AT [facility], WILL CALL WITH WANDY, GAVE PREP INSTRUCTIONS TO PT IN OFFCE

## 2024-06-12 ENCOUNTER — TELEPHONE (OUTPATIENT)
Dept: GASTROENTEROLOGY | Facility: CLINIC | Age: 44
End: 2024-06-12
Payer: COMMERCIAL

## 2024-06-12 NOTE — TELEPHONE ENCOUNTER
Hub staff attempted to follow warm transfer process and was unsuccessful     Caller: Brianne Mendoza    Relationship to patient: Self    Best call back number: [phone]     Patient is needing: PT HAS LAB APPT TODAY AT 10AM AND NEEDS TO RESCHEDULE. PLEASE CALL PT BACK TO R/S

## 2024-06-14 ENCOUNTER — LAB (OUTPATIENT)
Dept: GASTROENTEROLOGY | Facility: CLINIC | Age: 44
End: 2024-06-14
Payer: COMMERCIAL

## 2024-06-15 LAB
CRP SERPL-MCNC: 0.34 MG/DL (ref 0–0.5)
ERYTHROCYTE [DISTWIDTH] IN BLOOD BY AUTOMATED COUNT: 12.6 % (ref 12.3–15.4)
ERYTHROCYTE [SEDIMENTATION RATE] IN BLOOD BY WESTERGREN METHOD: 8 MM/HR (ref 0–20)
HCT VFR BLD AUTO: 41.3 % (ref 34–46.6)
HGB BLD-MCNC: 13.3 G/DL (ref 12–15.9)
MCH RBC QN AUTO: 31.8 PG (ref 26.6–33)
MCHC RBC AUTO-ENTMCNC: 32.2 G/DL (ref 31.5–35.7)
MCV RBC AUTO: 98.8 FL (ref 79–97)
PLATELET # BLD AUTO: 277 10*3/MM3 (ref 140–450)
RBC # BLD AUTO: 4.18 10*6/MM3 (ref 3.77–5.28)
WBC # BLD AUTO: 6 10*3/MM3 (ref 3.4–10.8)

## 2024-07-18 ENCOUNTER — OUTSIDE FACILITY SERVICE (OUTPATIENT)
Dept: GASTROENTEROLOGY | Facility: CLINIC | Age: 44
End: 2024-07-18
Payer: COMMERCIAL

## 2024-07-18 ENCOUNTER — LAB REQUISITION (OUTPATIENT)
Dept: LAB | Facility: HOSPITAL | Age: 44
End: 2024-07-18
Payer: COMMERCIAL

## 2024-07-18 DIAGNOSIS — Z12.11 ENCOUNTER FOR SCREENING COLONOSCOPY: ICD-10-CM

## 2024-07-18 PROCEDURE — 88305 TISSUE EXAM BY PATHOLOGIST: CPT | Performed by: INTERNAL MEDICINE

## 2024-07-18 RX ORDER — HYDROCORTISONE ACETATE 25 MG/1
25 SUPPOSITORY RECTAL NIGHTLY PRN
Qty: 30 SUPPOSITORY | Refills: 1 | Status: SHIPPED | OUTPATIENT
Start: 2024-07-18

## 2024-07-19 LAB
LAB AP CASE REPORT: NORMAL
PATH REPORT.FINAL DX SPEC: NORMAL
PATH REPORT.GROSS SPEC: NORMAL

## 2024-07-30 ENCOUNTER — TELEPHONE (OUTPATIENT)
Dept: GASTROENTEROLOGY | Facility: CLINIC | Age: 44
End: 2024-07-30
Payer: COMMERCIAL

## 2024-07-30 NOTE — TELEPHONE ENCOUNTER
----- Message from Hayde Ulrich sent at 7/29/2024  4:56 PM EDT -----  The polyp(s) showed hyperplastic change, which is non-cancerous and not pre-cancerous. Follow-up colonoscopy in 10 years is advised.

## 2024-07-30 NOTE — TELEPHONE ENCOUNTER
It is noted that this pt has seen their message from Dr Ulrich  Hm and cs recall placed for 7/18/34

## 2024-08-02 RX ORDER — PANTOPRAZOLE SODIUM 40 MG/1
40 TABLET, DELAYED RELEASE ORAL DAILY
Qty: 90 TABLET | Refills: 3 | Status: SHIPPED | OUTPATIENT
Start: 2024-08-02

## 2024-08-28 DIAGNOSIS — E03.9 ACQUIRED HYPOTHYROIDISM: ICD-10-CM

## 2024-08-28 RX ORDER — LEVOTHYROXINE SODIUM 50 UG/1
50 TABLET ORAL DAILY
Qty: 90 TABLET | Refills: 3 | OUTPATIENT
Start: 2024-08-28

## 2024-08-28 NOTE — TELEPHONE ENCOUNTER
Rx Refill Note  Requested Prescriptions     Pending Prescriptions Disp Refills    levothyroxine (SYNTHROID, LEVOTHROID) 50 MCG tablet [Pharmacy Med Name: LEVOTHYROXINE 0.05MG (50MCG) TAB] 90 tablet 3     Sig: TAKE 1 TABLET BY MOUTH DAILY      Last office visit with prescribing clinician: 6/12/2023   Last telemedicine visit with prescribing clinician: Visit date not found   Next office visit with prescribing clinician: Visit date not found                         Would you like a call back once the refill request has been completed: [] Yes [] No    If the office needs to give you a call back, can they leave a voicemail: [] Yes [] No    Larissa Roberts  08/28/24, 07:57 EDT

## 2024-11-08 RX ORDER — METOPROLOL SUCCINATE 25 MG/1
25 TABLET, EXTENDED RELEASE ORAL DAILY
Qty: 90 TABLET | Refills: 3 | Status: SHIPPED | OUTPATIENT
Start: 2024-11-08

## 2025-01-22 ENCOUNTER — OFFICE VISIT (OUTPATIENT)
Dept: INTERNAL MEDICINE | Facility: CLINIC | Age: 45
End: 2025-01-22
Payer: COMMERCIAL

## 2025-01-22 VITALS
BODY MASS INDEX: 32.39 KG/M2 | HEIGHT: 60 IN | OXYGEN SATURATION: 99 % | HEART RATE: 87 BPM | SYSTOLIC BLOOD PRESSURE: 118 MMHG | TEMPERATURE: 98.3 F | RESPIRATION RATE: 16 BRPM | WEIGHT: 165 LBS | DIASTOLIC BLOOD PRESSURE: 78 MMHG

## 2025-01-22 DIAGNOSIS — Z00.00 ANNUAL PHYSICAL EXAM: ICD-10-CM

## 2025-01-22 DIAGNOSIS — E78.5 HYPERLIPIDEMIA, UNSPECIFIED HYPERLIPIDEMIA TYPE: Primary | ICD-10-CM

## 2025-01-22 DIAGNOSIS — E03.9 HYPOTHYROIDISM, UNSPECIFIED TYPE: ICD-10-CM

## 2025-01-22 DIAGNOSIS — E03.9 ACQUIRED HYPOTHYROIDISM: ICD-10-CM

## 2025-01-22 DIAGNOSIS — D17.24 LIPOMA OF LEFT LOWER EXTREMITY: ICD-10-CM

## 2025-01-22 DIAGNOSIS — I49.3 PVC (PREMATURE VENTRICULAR CONTRACTION): ICD-10-CM

## 2025-01-22 DIAGNOSIS — F41.9 ANXIETY: ICD-10-CM

## 2025-01-22 DIAGNOSIS — R00.2 PALPITATIONS: ICD-10-CM

## 2025-01-22 DIAGNOSIS — Z76.0 MEDICATION REFILL: ICD-10-CM

## 2025-01-22 PROBLEM — Z87.442 HISTORY OF RENAL CALCULI: Status: ACTIVE | Noted: 2025-01-22

## 2025-01-22 PROBLEM — B00.1 COLD SORE: Status: ACTIVE | Noted: 2025-01-22

## 2025-01-22 PROBLEM — Z98.890 HISTORY OF LOOP ELECTROSURGICAL EXCISION PROCEDURE (LEEP): Status: ACTIVE | Noted: 2025-01-22

## 2025-01-22 PROCEDURE — 99396 PREV VISIT EST AGE 40-64: CPT | Performed by: NURSE PRACTITIONER

## 2025-01-22 PROCEDURE — 99214 OFFICE O/P EST MOD 30 MIN: CPT | Performed by: NURSE PRACTITIONER

## 2025-01-22 RX ORDER — LEVOTHYROXINE SODIUM 50 UG/1
50 TABLET ORAL DAILY
Qty: 90 TABLET | Refills: 1 | Status: SHIPPED | OUTPATIENT
Start: 2025-01-22

## 2025-01-22 RX ORDER — HYDROXYZINE HYDROCHLORIDE 25 MG/1
25 TABLET, FILM COATED ORAL EVERY 8 HOURS PRN
Qty: 60 TABLET | Refills: 0 | Status: SHIPPED | OUTPATIENT
Start: 2025-01-22 | End: 2025-02-21

## 2025-01-22 NOTE — PROGRESS NOTES
Chief Complaint  Annual Exam    Subjective        Brianne Mendoza presents to [facility] PR[facility]  History of Present Illness  History of Present Illness  The patient presents for a physical exam.    She has been under the care of a cardiologist for premature ventricular arrhythmia, with annual follow-ups. She was previously on a calcium channel blocker, which was discontinued during her pregnancy, and has since been maintained on metoprolol.    Her endocrinologist has advised that she no longer requires their oversight for her thyroid medication, as she has been on a stable dose of levothyroxine for several years. She is currently seeking a new provider to manage this medication.    She has been on semaglutide 0.25 mg for the past 2 weeks, prescribed by her OB/GYN. She is also on birth control and has experienced a missed  in the past. She is not currently attempting to conceive. She has had 3 successful ultrasounds and is scheduled for a 12-week visit. She is not fasting today and has consumed half a breakfast sandwich and a sugary coffee.     She underwent a colonoscopy over the summer due to rectal bleeding, which was suspected to be hemorrhoidal in origin. The results were normal, and she is due for a repeat in 10 years. She recently had a mammogram and Pap smear, both of which were normal. She is up to date with her vision and dental check-ups. She is attempting to increase her physical activity and reports normal bowel movements, although she has experienced some diarrhea. She has been seeing a gastroenterologist for this issue, but tests have not revealed any abnormalities. She has no leg swelling and reports regular menstrual cycles.    She has a mass in her groin area, which was previously tested 10 years ago and found to be benign. An MRI was performed at that time, and an attempt to drain the mass was unsuccessful. The mass has since grown but does not cause her discomfort.  "She has noticed a knot in her leg over the past 2 weeks, which she has attempted to massage out. She is uncertain if it is a fatty tumor. She reports no associated pain or injury.    She reports feeling anxious all the time and having trouble sleeping. She was put on medication for anxiety when she was pregnant, but she did not feel right on Zoloft. She was then put on another medication that started with a \"C,\" which worked fine. She feels like her anxiety is worse at times and feels like she is going to have a panic attack. She has sought counseling in the past but not recently.    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    FAMILY HISTORY  Her mother has a bad stomach, and it runs on her whole mother's side.    ALLERGIES  The patient has no known allergies.    MEDICATIONS  Current: Metoprolol, levothyroxine, semaglutide       Objective   Vital Signs:  /78   Pulse 87   Temp 98.3 °F (36.8 °C) (Oral)   Resp 16   Ht 152.4 cm (60\")   Wt 74.8 kg (165 lb)   SpO2 99%   BMI 32.22 kg/m²   Estimated body mass index is 32.22 kg/m² as calculated from the following:    Height as of this encounter: 152.4 cm (60\").    Weight as of this encounter: 74.8 kg (165 lb).       BMI is >= 30 and <35. (Class 1 Obesity). The following options were offered after discussion;: exercise counseling/recommendations and nutrition counseling/recommendations      Physical Exam  Vitals and nursing note reviewed.   Constitutional:       Appearance: Normal appearance.   HENT:      Head: Normocephalic.      Right Ear: Tympanic membrane, ear canal and external ear normal.      Left Ear: Tympanic membrane, ear canal and external ear normal.      Nose: Nose normal.      Mouth/Throat:      Mouth: Mucous membranes are moist.   Eyes:      Pupils: Pupils are equal, round, and reactive to light.   Cardiovascular:      Rate and Rhythm: Normal rate and regular rhythm.      Pulses: Normal pulses.      Heart sounds: " Normal heart sounds.      Comments: No peripheral edema noted  Pulmonary:      Effort: Pulmonary effort is normal. No respiratory distress.      Breath sounds: Normal breath sounds. No stridor. No wheezing, rhonchi or rales.      Comments: Denies SOB  Chest:      Chest wall: No tenderness.   Musculoskeletal:         General: Normal range of motion.   Skin:     General: Skin is warm.      Capillary Refill: Capillary refill takes less than 2 seconds.   Neurological:      Mental Status: She is alert and oriented to person, place, and time.   Psychiatric:         Behavior: Behavior normal.        Physical Exam  Lungs are clear.    Vital Signs  Blood pressure is below normal. Pulse is 87. Oxygen saturation is at 99 percent.     Result Review :      Common labs          6/14/2024    10:06 1/22/2025    11:52   Common Labs   Glucose  68    BUN  12    Creatinine  0.79    Sodium  139    Potassium  4.4    Chloride  101    Calcium  9.3    Total Protein  7.3    Albumin  4.6    Total Bilirubin  0.4    Alkaline Phosphatase  61    AST (SGOT)  24    ALT (SGPT)  25    WBC 6.00  6.0    Hemoglobin 13.3  13.2    Hematocrit 41.3  39.4    Platelets 277  304    Total Cholesterol  222    Triglycerides  194    HDL Cholesterol  47    LDL Cholesterol   140    Hemoglobin A1C  5.3        Results  Office Visit with Roslyn Gandhi APRN (09/27/2022)       Imaging  Colonoscopy was normal.              Assessment and Plan     Diagnoses and all orders for this visit:    1. Hyperlipidemia, unspecified hyperlipidemia type (Primary)  -     CBC & Differential  -     Comprehensive Metabolic Panel  -     Hemoglobin A1c  -     Lipid Panel  -     TSH Rfx On Abnormal To Free T4    2. Hypothyroidism, unspecified type  -     CBC & Differential  -     Comprehensive Metabolic Panel  -     Hemoglobin A1c  -     Lipid Panel  -     TSH Rfx On Abnormal To Free T4    3. PVC (premature ventricular contraction)  -     CBC & Differential  -     Comprehensive Metabolic  Red X (Sepsis) Panel  -     Hemoglobin A1c  -     Lipid Panel  -     TSH Rfx On Abnormal To Free T4    4. Palpitations  -     CBC & Differential  -     Comprehensive Metabolic Panel  -     Hemoglobin A1c  -     Lipid Panel  -     TSH Rfx On Abnormal To Free T4    5. Medication refill  -     CBC & Differential  -     Comprehensive Metabolic Panel  -     Hemoglobin A1c  -     Lipid Panel  -     TSH Rfx On Abnormal To Free T4    6. Annual physical exam  -     CBC & Differential  -     Comprehensive Metabolic Panel  -     Hemoglobin A1c  -     Lipid Panel  -     TSH Rfx On Abnormal To Free T4  -     Vitamin D,25-Hydroxy    7. Acquired hypothyroidism  -     CBC & Differential  -     Comprehensive Metabolic Panel  -     Hemoglobin A1c  -     Lipid Panel  -     TSH Rfx On Abnormal To Free T4  -     levothyroxine (SYNTHROID, LEVOTHROID) 50 MCG tablet; Take 1 tablet by mouth Daily.  Dispense: 90 tablet; Refill: 1    8. Lipoma of left lower extremity  -     Ambulatory Referral to General Surgery    9. Anxiety  -     GeneSight - Swab,    Other orders  -     Order Using Order Sets/Smartsets; Future  -     hydrOXYzine (ATARAX) 25 MG tablet; Take 1 tablet by mouth Every 8 (Eight) Hours As Needed for Itching or Anxiety for up to 30 days.  Dispense: 60 tablet; Refill: 0      Assessment & Plan  1. Premature ventricular arrhythmia.  She has been on metoprolol since her pregnancy and continues to see cardiology once a year.    2. Thyroid management.  She has been on the same dose of levothyroxine for years. A prescription for levothyroxine will be provided, with a 90-day supply and a refill for 6 months. Blood work will be conducted every 6 months to ensure the dosage is appropriate.    3. Weight management.  She has been on semaglutide 0.25 mg for 2 weeks, prescribed by her OB/GYN. She is advised to continue this medication and incorporate regular exercise to enhance muscle mass.    4. Lipoma.  She has a lipoma in her groin area that has grown over  the past 10-12 years. An ultrasound of the left leg, specifically the hamstring area, will be ordered to further evaluate the lipoma. A referral to general surgery will be made for potential removal.    5. Anxiety.  She reports feeling anxious all the time and having trouble sleeping. Hydroxyzine will be prescribed as needed for anxiety and panic attacks. A GeneSight test will be conducted to determine the most suitable medication for her. She is encouraged to seek counseling for additional support.    6. Health maintenance.  She had a colonoscopy over the summer, which was normal. She also had a mammogram and Pap smear in January, both of which were normal. Blood work will be conducted to assess her overall health status.    PROCEDURE  Colonoscopy was performed over the summer and yielded normal results.         I spent 50 minutes caring for Brianne on this date of service. This time includes time spent by me in the following activities:preparing for the visit, reviewing tests, obtaining and/or reviewing a separately obtained history, performing a medically appropriate examination and/or evaluation , counseling and educating the patient/family/caregiver, ordering medications, tests, or procedures, referring and communicating with other health care professionals , documenting information in the medical record, independently interpreting results and communicating that information with the patient/family/caregiver, and care coordination  Follow Up     Return in about 1 week (around 1/29/2025) for Recheck, Video visit.  Patient was given instructions and counseling regarding her condition or for health maintenance advice. Please see specific information pulled into the AVS if appropriate.     Patient or patient representative verbalized consent for the use of Ambient Listening during the visit with  MARCOS Elder for chart documentation. 1/31/2025  15:57 EST

## 2025-01-23 LAB
25(OH)D3+25(OH)D2 SERPL-MCNC: 37.2 NG/ML (ref 30–100)
ALBUMIN SERPL-MCNC: 4.6 G/DL (ref 3.9–4.9)
ALP SERPL-CCNC: 61 IU/L (ref 44–121)
ALT SERPL-CCNC: 25 IU/L (ref 0–32)
AST SERPL-CCNC: 24 IU/L (ref 0–40)
BASOPHILS # BLD AUTO: 0.1 X10E3/UL (ref 0–0.2)
BASOPHILS NFR BLD AUTO: 1 %
BILIRUB SERPL-MCNC: 0.4 MG/DL (ref 0–1.2)
BUN SERPL-MCNC: 12 MG/DL (ref 6–24)
BUN/CREAT SERPL: 15 (ref 9–23)
CALCIUM SERPL-MCNC: 9.3 MG/DL (ref 8.7–10.2)
CHLORIDE SERPL-SCNC: 101 MMOL/L (ref 96–106)
CHOLEST SERPL-MCNC: 222 MG/DL (ref 100–199)
CO2 SERPL-SCNC: 22 MMOL/L (ref 20–29)
CREAT SERPL-MCNC: 0.79 MG/DL (ref 0.57–1)
EGFRCR SERPLBLD CKD-EPI 2021: 95 ML/MIN/1.73
EOSINOPHIL # BLD AUTO: 0.2 X10E3/UL (ref 0–0.4)
EOSINOPHIL NFR BLD AUTO: 4 %
ERYTHROCYTE [DISTWIDTH] IN BLOOD BY AUTOMATED COUNT: 12 % (ref 11.7–15.4)
GLOBULIN SER CALC-MCNC: 2.7 G/DL (ref 1.5–4.5)
GLUCOSE SERPL-MCNC: 68 MG/DL (ref 70–99)
HBA1C MFR BLD: 5.3 % (ref 4.8–5.6)
HCT VFR BLD AUTO: 39.4 % (ref 34–46.6)
HDLC SERPL-MCNC: 47 MG/DL
HGB BLD-MCNC: 13.2 G/DL (ref 11.1–15.9)
IMM GRANULOCYTES # BLD AUTO: 0 X10E3/UL (ref 0–0.1)
IMM GRANULOCYTES NFR BLD AUTO: 1 %
LDLC SERPL CALC-MCNC: 140 MG/DL (ref 0–99)
LYMPHOCYTES # BLD AUTO: 1.4 X10E3/UL (ref 0.7–3.1)
LYMPHOCYTES NFR BLD AUTO: 23 %
MCH RBC QN AUTO: 32 PG (ref 26.6–33)
MCHC RBC AUTO-ENTMCNC: 33.5 G/DL (ref 31.5–35.7)
MCV RBC AUTO: 96 FL (ref 79–97)
MONOCYTES # BLD AUTO: 0.4 X10E3/UL (ref 0.1–0.9)
MONOCYTES NFR BLD AUTO: 7 %
NEUTROPHILS # BLD AUTO: 3.9 X10E3/UL (ref 1.4–7)
NEUTROPHILS NFR BLD AUTO: 64 %
PLATELET # BLD AUTO: 304 X10E3/UL (ref 150–450)
POTASSIUM SERPL-SCNC: 4.4 MMOL/L (ref 3.5–5.2)
PROT SERPL-MCNC: 7.3 G/DL (ref 6–8.5)
RBC # BLD AUTO: 4.12 X10E6/UL (ref 3.77–5.28)
SODIUM SERPL-SCNC: 139 MMOL/L (ref 134–144)
TRIGL SERPL-MCNC: 194 MG/DL (ref 0–149)
TSH SERPL DL<=0.005 MIU/L-ACNC: 1.5 UIU/ML (ref 0.45–4.5)
VLDLC SERPL CALC-MCNC: 35 MG/DL (ref 5–40)
WBC # BLD AUTO: 6 X10E3/UL (ref 3.4–10.8)

## 2025-01-28 ENCOUNTER — TELEMEDICINE (OUTPATIENT)
Dept: INTERNAL MEDICINE | Facility: CLINIC | Age: 45
End: 2025-01-28
Payer: COMMERCIAL

## 2025-01-28 DIAGNOSIS — F41.9 ANXIETY: ICD-10-CM

## 2025-01-28 DIAGNOSIS — Z13.30 ENCOUNTER FOR BEHAVIORAL HEALTH SCREENING: Primary | ICD-10-CM

## 2025-01-28 DIAGNOSIS — E78.5 HYPERLIPIDEMIA, UNSPECIFIED HYPERLIPIDEMIA TYPE: ICD-10-CM

## 2025-01-28 DIAGNOSIS — Z76.0 MEDICATION REFILL: ICD-10-CM

## 2025-01-28 DIAGNOSIS — Z09 FOLLOW-UP EXAM: ICD-10-CM

## 2025-01-28 DIAGNOSIS — D49.2 ABNORMAL SKIN GROWTH: ICD-10-CM

## 2025-01-28 PROCEDURE — 99214 OFFICE O/P EST MOD 30 MIN: CPT | Performed by: NURSE PRACTITIONER

## 2025-01-28 RX ORDER — CITALOPRAM HYDROBROMIDE 10 MG/1
10 TABLET ORAL DAILY
Qty: 30 TABLET | Refills: 1 | Status: SHIPPED | OUTPATIENT
Start: 2025-01-28 | End: 2025-02-27

## 2025-01-28 NOTE — PROGRESS NOTES
Chief Complaint  Anxiety Follow Up     Cholesterol management         Subjective        Brianne Mendoza presents to [facility] PRIMARY [facility]ARE  History of Present Illness  History of Present Illness    You have chosen to receive care through a telehealth visit.  Do you consent to use a video/audio connection for your medical care today? Yes    Patient is in [place], [place] and I am on campus in [place], [place].     The patient presents via virtual visit for GeneSight follow-up, anxiety, dermatology referral, and cholesterol management.    She has previously experienced adverse reactions to Zoloft, including exacerbation of anxiety symptoms. She has a history of using hydroxyzine, which she reports as beneficial, although with a delayed onset of action. She is uncertain about her past use of Wellbutrin.     She has also tried Prozac in the distant past, which she found effective but discontinued. During her pregnancy, she was prescribed citalopram, which she continued for several months postpartum before discontinuing due to relocation and loss of her previous healthcare provider. She reports an improvement in her condition since her last visit but continues to experience anxiety more frequently than desired. She describes persistent intrusive thoughts upon waking, although she does not endorse any self-harm ideation.    She has expressed interest in undergoing a skin examination due to the presence of an irregularly shaped mole. She has a history of two moles being removed from her back while residing in [place], which were subsequently tested and found to be precancerous. These areas have not been re-evaluated since their removal.    She has requested a fasting cholesterol test. She recalls consuming steak tacos with medium-rare meat the night prior to her last test and a turkey sausage breakfast wrap on the morning of the test.    MEDICATIONS  Current: hydroxyzine  Past: Zoloft, Prozac, citalopram    "    Objective   Vital Signs:  There were no vitals taken for this visit.  Estimated body mass index is 32.22 kg/m² as calculated from the following:    Height as of 1/22/25: 152.4 cm (60\").    Weight as of 1/22/25: 74.8 kg (165 lb).             Physical Exam  Constitutional:       Appearance: Normal appearance.   Neurological:      Mental Status: She is alert and oriented to person, place, and time.   Psychiatric:         Behavior: Behavior normal.        Physical Exam       Result Review :      Common labs          6/14/2024    10:06 1/22/2025    11:52   Common Labs   Glucose  68    BUN  12    Creatinine  0.79    Sodium  139    Potassium  4.4    Chloride  101    Calcium  9.3    Total Protein  7.3    Albumin  4.6    Total Bilirubin  0.4    Alkaline Phosphatase  61    AST (SGOT)  24    ALT (SGPT)  25    WBC 6.00  6.0    Hemoglobin 13.3  13.2    Hematocrit 41.3  39.4    Platelets 277  304    Total Cholesterol  222    Triglycerides  194    HDL Cholesterol  47    LDL Cholesterol   140    Hemoglobin A1C  5.3        Results  Laboratory Studies  Cholesterol level was 222.              Assessment and Plan     Diagnoses and all orders for this visit:    1. Encounter for behavioral health screening (Primary)  -     Ambulatory Referral to Behavioral Health    2. Anxiety  -     Ambulatory Referral to Behavioral Health    3. Abnormal skin growth  -     Ambulatory Referral to Dermatology    4. Hyperlipidemia, unspecified hyperlipidemia type  -     Lipid Panel    5. Medication refill    6. Follow-up exam    Other orders  -     citalopram (CeleXA) 10 MG tablet; Take 1 tablet by mouth Daily for 30 days.  Dispense: 30 tablet; Refill: 1      Assessment & Plan  1. Anxiety.  The [facility] test results indicate moderate gene-drug interactions with 14 different medications, limiting the options for pharmacological intervention. She has previously experienced adverse effects with Zoloft, which exacerbated her anxiety. Pristiq is primarily " indicated for depression, a condition she does not have. Citalopram is typically used for major depressive disorder or OCD, but not specifically for anxiety. However, given her limited treatment options, it may be worth considering. She has one drug-gene interaction with Wellbutrin, suggesting a potential risk associated with its use. A prescription for citalopram 10 mg daily will be provided, with the potential to increase the dosage between 6 to 8 weeks if necessary. She is advised to take hydroxyzine 25 mg at bedtime or during periods of heightened anxiety. A referral to behavioral health will be made for further evaluation and potential counseling. She is encouraged to contact the central scheduling line or [facility] directly to arrange an appointment. She is instructed to discontinue citalopram and inform the provider immediately if any side effects occur.    2. Dermatology referral.  A referral to dermatology will be made for a skin examination. She is advised to contact the central scheduling line or [facility] directly to arrange an appointment. She is also encouraged to seek a local dermatologist if preferred.    3. Cholesterol management.  Her cholesterol level was 222, which is not significantly elevated. She was not fasting during the previous test, and her diet prior to the test may have contributed to the result. A recheck of her cholesterol levels will be scheduled in 3 months. She is advised to fast prior to the test to ensure accurate results.    Follow-up  The patient will follow up in 6 weeks.    PROCEDURE  The patient had 2 moles removed from her back while residing in [place], which were subsequently tested and found to be precancerous.         I spent 30 minutes caring for Brianne on this date of service. This time includes time spent by me in the following activities:preparing for the visit, reviewing tests, obtaining and/or reviewing a separately obtained history, performing a medically  appropriate examination and/or evaluation , counseling and educating the patient/family/caregiver, ordering medications, tests, or procedures, referring and communicating with other health care professionals , documenting information in the medical record, independently interpreting results and communicating that information with the patient/family/caregiver, and care coordination  Follow Up     Return in about 6 weeks (around 3/11/2025) for Recheck, Video visit.  Patient was given instructions and counseling regarding her condition or for health maintenance advice. Please see specific information pulled into the AVS if appropriate.     Patient or patient representative verbalized consent for the use of Ambient Listening during the visit with  MARCOS Elder for chart documentation. 1/28/2025  12:54 EST

## 2025-03-07 ENCOUNTER — TELEPHONE (OUTPATIENT)
Dept: INTERNAL MEDICINE | Facility: CLINIC | Age: 45
End: 2025-03-07
Payer: COMMERCIAL

## 2025-03-07 NOTE — TELEPHONE ENCOUNTER
CALLED IN REGARDS TO RESCHEDULING 03/14/2025 DUE TO PROVIDER BEING OUT OF OFFICE. CALL DROPPED, NO VM WAS ABLE TO BE LEFT.

## 2025-03-24 ENCOUNTER — TELEPHONE (OUTPATIENT)
Dept: SURGERY | Facility: CLINIC | Age: 45
End: 2025-03-24

## 2025-03-24 NOTE — TELEPHONE ENCOUNTER
Caller: TUAN IGLESIAS    Relationship:  SELF    Best call back number: [phone]    PATIENT CALLED REQUESTING TO CANCEL SAME DAY APPT.    Did the patient call AFTER the start time of their scheduled appointment?  []YES  [x]NO    Was the patient's appointment rescheduled? [x]YES  []NO    Any additional information: PT CARI TO 4-7-25 PT CHOICE. PT ALSO WANTED TO SEE A FEMALE DR, PT IS A NEW PT

## 2025-04-07 ENCOUNTER — OFFICE VISIT (OUTPATIENT)
Dept: SURGERY | Facility: CLINIC | Age: 45
End: 2025-04-07
Payer: COMMERCIAL

## 2025-04-07 VITALS
TEMPERATURE: 98.1 F | SYSTOLIC BLOOD PRESSURE: 120 MMHG | WEIGHT: 174 LBS | DIASTOLIC BLOOD PRESSURE: 80 MMHG | HEIGHT: 60 IN | BODY MASS INDEX: 34.16 KG/M2 | OXYGEN SATURATION: 96 % | HEART RATE: 92 BPM

## 2025-04-07 DIAGNOSIS — D17.24 LIPOMA OF LEFT LOWER EXTREMITY: ICD-10-CM

## 2025-04-07 DIAGNOSIS — D49.2 SOFT TISSUE NEOPLASM: Primary | ICD-10-CM

## 2025-04-07 RX ORDER — HYDROXYZINE HYDROCHLORIDE 25 MG/1
1 TABLET, FILM COATED ORAL
COMMUNITY

## 2025-04-07 RX ORDER — TRETINOIN 0.25 MG/G
1 CREAM TOPICAL
COMMUNITY
Start: 2025-03-17

## 2025-04-22 ENCOUNTER — HOSPITAL ENCOUNTER (OUTPATIENT)
Dept: ULTRASOUND IMAGING | Facility: HOSPITAL | Age: 45
Discharge: HOME | End: 2025-04-22
Admitting: STUDENT IN AN ORGANIZED HEALTH CARE EDUCATION/TRAINING PROGRAM
Payer: COMMERCIAL

## 2025-04-22 DIAGNOSIS — D49.2 SOFT TISSUE NEOPLASM: ICD-10-CM

## 2025-04-22 PROCEDURE — 76999 ECHO EXAMINATION PROCEDURE: CPT

## 2025-04-23 RX ORDER — HYDROXYZINE HYDROCHLORIDE 25 MG/1
TABLET, FILM COATED ORAL
Qty: 60 TABLET | Refills: 0 | Status: SHIPPED | OUTPATIENT
Start: 2025-04-23 | End: 2025-05-23

## 2025-04-23 NOTE — TELEPHONE ENCOUNTER
The patient will follow up in 6 weeks. (Please call patient to get her scheduled for a follow up visit). Thank you. Roslyn

## 2025-04-24 ENCOUNTER — TELEPHONE (OUTPATIENT)
Dept: INTERNAL MEDICINE | Facility: CLINIC | Age: 45
End: 2025-04-24
Payer: COMMERCIAL

## 2025-04-24 NOTE — TELEPHONE ENCOUNTER
"Called pt, Pt didn't answer no VM    Relay     \"Called pt to get a 6 week fup scheduled per Roslyn. \"          "

## 2025-04-29 ENCOUNTER — RESULTS FOLLOW-UP (OUTPATIENT)
Dept: SURGERY | Facility: CLINIC | Age: 45
End: 2025-04-29
Payer: COMMERCIAL

## 2025-05-04 ENCOUNTER — HOSPITAL ENCOUNTER (OUTPATIENT)
Facility: HOSPITAL | Age: 45
Setting detail: OBSERVATION
Discharge: HOME | End: 2025-05-05
Attending: EMERGENCY MEDICINE | Admitting: EMERGENCY MEDICINE
Payer: COMMERCIAL

## 2025-05-04 ENCOUNTER — APPOINTMENT (OUTPATIENT)
Dept: CT IMAGING | Facility: HOSPITAL | Age: 45
End: 2025-05-04
Payer: COMMERCIAL

## 2025-05-04 ENCOUNTER — HOSPITAL ENCOUNTER (EMERGENCY)
Facility: HOSPITAL | Age: 45
Discharge: HOME | End: 2025-05-04
Attending: EMERGENCY MEDICINE | Admitting: EMERGENCY MEDICINE
Payer: COMMERCIAL

## 2025-05-04 VITALS
OXYGEN SATURATION: 99 % | HEART RATE: 75 BPM | SYSTOLIC BLOOD PRESSURE: 136 MMHG | HEIGHT: 60 IN | WEIGHT: 169 LBS | TEMPERATURE: 97.7 F | RESPIRATION RATE: 16 BRPM | BODY MASS INDEX: 33.18 KG/M2 | DIASTOLIC BLOOD PRESSURE: 85 MMHG

## 2025-05-04 DIAGNOSIS — E87.29 METABOLIC ACIDOSIS, INCREASED ANION GAP: ICD-10-CM

## 2025-05-04 DIAGNOSIS — R19.7 ABDOMINAL PAIN, VOMITING, AND DIARRHEA: Primary | ICD-10-CM

## 2025-05-04 DIAGNOSIS — R79.89 ABNORMAL LIVER FUNCTION TESTS: ICD-10-CM

## 2025-05-04 DIAGNOSIS — R10.9 ABDOMINAL PAIN, VOMITING, AND DIARRHEA: Primary | ICD-10-CM

## 2025-05-04 DIAGNOSIS — R11.10 ABDOMINAL PAIN, VOMITING, AND DIARRHEA: Primary | ICD-10-CM

## 2025-05-04 DIAGNOSIS — R11.2 NAUSEA AND VOMITING IN ADULT PATIENT: Primary | ICD-10-CM

## 2025-05-04 DIAGNOSIS — E87.6 HYPOKALEMIA: ICD-10-CM

## 2025-05-04 LAB
ALBUMIN SERPL-MCNC: 4.4 G/DL (ref 3.5–5.2)
ALBUMIN SERPL-MCNC: 4.6 G/DL (ref 3.5–5.2)
ALBUMIN/GLOB SERPL: 1.5 G/DL
ALBUMIN/GLOB SERPL: 1.7 G/DL
ALP SERPL-CCNC: 62 U/L (ref 39–117)
ALP SERPL-CCNC: 65 U/L (ref 39–117)
ALT SERPL W P-5'-P-CCNC: 67 U/L (ref 1–33)
ALT SERPL W P-5'-P-CCNC: 77 U/L (ref 1–33)
AMPHET+METHAMPHET UR QL: NEGATIVE
AMPHETAMINES UR QL: NEGATIVE
ANION GAP SERPL CALCULATED.3IONS-SCNC: 16.4 MMOL/L (ref 5–15)
ANION GAP SERPL CALCULATED.3IONS-SCNC: 17.7 MMOL/L (ref 5–15)
AST SERPL-CCNC: 39 U/L (ref 1–32)
AST SERPL-CCNC: 54 U/L (ref 1–32)
B PARAPERT DNA SPEC QL NAA+PROBE: NOT DETECTED
B PERT DNA SPEC QL NAA+PROBE: NOT DETECTED
BARBITURATES UR QL SCN: NEGATIVE
BASOPHILS # BLD AUTO: 0.01 10*3/MM3 (ref 0–0.2)
BASOPHILS # BLD AUTO: 0.04 10*3/MM3 (ref 0–0.2)
BASOPHILS NFR BLD AUTO: 0.1 % (ref 0–1.5)
BASOPHILS NFR BLD AUTO: 0.4 % (ref 0–1.5)
BENZODIAZ UR QL SCN: NEGATIVE
BILIRUB SERPL-MCNC: 0.9 MG/DL (ref 0–1.2)
BILIRUB SERPL-MCNC: 0.9 MG/DL (ref 0–1.2)
BILIRUB UR QL STRIP: NEGATIVE
BUN SERPL-MCNC: 4 MG/DL (ref 6–20)
BUN SERPL-MCNC: 5 MG/DL (ref 6–20)
BUN/CREAT SERPL: 4.9 (ref 7–25)
BUN/CREAT SERPL: 6 (ref 7–25)
BUPRENORPHINE SERPL-MCNC: NEGATIVE NG/ML
C PNEUM DNA NPH QL NAA+NON-PROBE: NOT DETECTED
CALCIUM SPEC-SCNC: 8.2 MG/DL (ref 8.6–10.5)
CALCIUM SPEC-SCNC: 9.2 MG/DL (ref 8.6–10.5)
CANNABINOIDS SERPL QL: NEGATIVE
CHLORIDE SERPL-SCNC: 100 MMOL/L (ref 98–107)
CHLORIDE SERPL-SCNC: 102 MMOL/L (ref 98–107)
CLARITY UR: CLEAR
CO2 SERPL-SCNC: 18.3 MMOL/L (ref 22–29)
CO2 SERPL-SCNC: 18.6 MMOL/L (ref 22–29)
COCAINE UR QL: NEGATIVE
COLOR UR: YELLOW
CREAT SERPL-MCNC: 0.82 MG/DL (ref 0.57–1)
CREAT SERPL-MCNC: 0.84 MG/DL (ref 0.57–1)
DEPRECATED RDW RBC AUTO: 41.8 FL (ref 37–54)
DEPRECATED RDW RBC AUTO: 43.6 FL (ref 37–54)
EGFRCR SERPLBLD CKD-EPI 2021: 88.6 ML/MIN/1.73
EGFRCR SERPLBLD CKD-EPI 2021: 91.2 ML/MIN/1.73
EOSINOPHIL # BLD AUTO: 0 10*3/MM3 (ref 0–0.4)
EOSINOPHIL # BLD AUTO: 0.09 10*3/MM3 (ref 0–0.4)
EOSINOPHIL NFR BLD AUTO: 0 % (ref 0.3–6.2)
EOSINOPHIL NFR BLD AUTO: 0.9 % (ref 0.3–6.2)
ERYTHROCYTE [DISTWIDTH] IN BLOOD BY AUTOMATED COUNT: 12.2 % (ref 12.3–15.4)
ERYTHROCYTE [DISTWIDTH] IN BLOOD BY AUTOMATED COUNT: 12.4 % (ref 12.3–15.4)
FENTANYL UR-MCNC: NEGATIVE NG/ML
FLUAV SUBTYP SPEC NAA+PROBE: NOT DETECTED
FLUBV RNA ISLT QL NAA+PROBE: NOT DETECTED
GEN 5 1HR TROPONIN T REFLEX: <6 NG/L
GLOBULIN UR ELPH-MCNC: 2.7 GM/DL
GLOBULIN UR ELPH-MCNC: 3 GM/DL
GLUCOSE SERPL-MCNC: 131 MG/DL (ref 65–99)
GLUCOSE SERPL-MCNC: 159 MG/DL (ref 65–99)
GLUCOSE UR STRIP-MCNC: ABNORMAL MG/DL
HADV DNA SPEC NAA+PROBE: NOT DETECTED
HCG SERPL QL: NEGATIVE
HCOV 229E RNA SPEC QL NAA+PROBE: NOT DETECTED
HCOV HKU1 RNA SPEC QL NAA+PROBE: NOT DETECTED
HCOV NL63 RNA SPEC QL NAA+PROBE: NOT DETECTED
HCOV OC43 RNA SPEC QL NAA+PROBE: NOT DETECTED
HCT VFR BLD AUTO: 38.9 % (ref 34–46.6)
HCT VFR BLD AUTO: 39.6 % (ref 34–46.6)
HGB BLD-MCNC: 13.2 G/DL (ref 12–15.9)
HGB BLD-MCNC: 13.3 G/DL (ref 12–15.9)
HGB UR QL STRIP.AUTO: NEGATIVE
HMPV RNA NPH QL NAA+NON-PROBE: NOT DETECTED
HOLD SPECIMEN: NORMAL
HOLD SPECIMEN: NORMAL
HPIV1 RNA ISLT QL NAA+PROBE: NOT DETECTED
HPIV2 RNA SPEC QL NAA+PROBE: NOT DETECTED
HPIV3 RNA NPH QL NAA+PROBE: NOT DETECTED
HPIV4 P GENE NPH QL NAA+PROBE: NOT DETECTED
IMM GRANULOCYTES # BLD AUTO: 0.02 10*3/MM3 (ref 0–0.05)
IMM GRANULOCYTES # BLD AUTO: 0.02 10*3/MM3 (ref 0–0.05)
IMM GRANULOCYTES NFR BLD AUTO: 0.2 % (ref 0–0.5)
IMM GRANULOCYTES NFR BLD AUTO: 0.3 % (ref 0–0.5)
KETONES UR QL STRIP: ABNORMAL
LEUKOCYTE ESTERASE UR QL STRIP.AUTO: NEGATIVE
LIPASE SERPL-CCNC: 22 U/L (ref 13–60)
LIPASE SERPL-CCNC: 25 U/L (ref 13–60)
LYMPHOCYTES # BLD AUTO: 0.67 10*3/MM3 (ref 0.7–3.1)
LYMPHOCYTES # BLD AUTO: 1.74 10*3/MM3 (ref 0.7–3.1)
LYMPHOCYTES NFR BLD AUTO: 17.6 % (ref 19.6–45.3)
LYMPHOCYTES NFR BLD AUTO: 9.7 % (ref 19.6–45.3)
M PNEUMO IGG SER IA-ACNC: NOT DETECTED
MAGNESIUM SERPL-MCNC: 1.6 MG/DL (ref 1.6–2.6)
MCH RBC QN AUTO: 31.6 PG (ref 26.6–33)
MCH RBC QN AUTO: 31.9 PG (ref 26.6–33)
MCHC RBC AUTO-ENTMCNC: 33.6 G/DL (ref 31.5–35.7)
MCHC RBC AUTO-ENTMCNC: 33.9 G/DL (ref 31.5–35.7)
MCV RBC AUTO: 94 FL (ref 79–97)
MCV RBC AUTO: 94.1 FL (ref 79–97)
METHADONE UR QL SCN: NEGATIVE
MONOCYTES # BLD AUTO: 0.1 10*3/MM3 (ref 0.1–0.9)
MONOCYTES # BLD AUTO: 0.57 10*3/MM3 (ref 0.1–0.9)
MONOCYTES NFR BLD AUTO: 1.5 % (ref 5–12)
MONOCYTES NFR BLD AUTO: 5.8 % (ref 5–12)
NEUTROPHILS NFR BLD AUTO: 6.09 10*3/MM3 (ref 1.7–7)
NEUTROPHILS NFR BLD AUTO: 7.41 10*3/MM3 (ref 1.7–7)
NEUTROPHILS NFR BLD AUTO: 75.1 % (ref 42.7–76)
NEUTROPHILS NFR BLD AUTO: 88.4 % (ref 42.7–76)
NITRITE UR QL STRIP: NEGATIVE
NRBC BLD AUTO-RTO: 0 /100 WBC (ref 0–0.2)
OPIATES UR QL: NEGATIVE
OXYCODONE UR QL SCN: NEGATIVE
PCP UR QL SCN: NEGATIVE
PH UR STRIP.AUTO: 7 [PH] (ref 5–8)
PLATELET # BLD AUTO: 302 10*3/MM3 (ref 140–450)
PLATELET # BLD AUTO: 303 10*3/MM3 (ref 140–450)
PMV BLD AUTO: 10.1 FL (ref 6–12)
PMV BLD AUTO: 9.9 FL (ref 6–12)
POTASSIUM SERPL-SCNC: 3.4 MMOL/L (ref 3.5–5.2)
POTASSIUM SERPL-SCNC: 3.7 MMOL/L (ref 3.5–5.2)
PROT SERPL-MCNC: 7.3 G/DL (ref 6–8.5)
PROT SERPL-MCNC: 7.4 G/DL (ref 6–8.5)
PROT UR QL STRIP: ABNORMAL
RBC # BLD AUTO: 4.14 10*6/MM3 (ref 3.77–5.28)
RBC # BLD AUTO: 4.21 10*6/MM3 (ref 3.77–5.28)
RHINOVIRUS RNA SPEC NAA+PROBE: NOT DETECTED
RSV RNA NPH QL NAA+NON-PROBE: NOT DETECTED
SARS-COV-2 RNA NPH QL NAA+NON-PROBE: NOT DETECTED
SODIUM SERPL-SCNC: 135 MMOL/L (ref 136–145)
SODIUM SERPL-SCNC: 138 MMOL/L (ref 136–145)
SP GR UR STRIP: 1.02 (ref 1–1.03)
TRICYCLICS UR QL SCN: NEGATIVE
TROPONIN T NUMERIC DELTA: NORMAL
TROPONIN T SERPL HS-MCNC: <6 NG/L
UROBILINOGEN UR QL STRIP: ABNORMAL
WBC NRBC COR # BLD AUTO: 6.89 10*3/MM3 (ref 3.4–10.8)
WBC NRBC COR # BLD AUTO: 9.87 10*3/MM3 (ref 3.4–10.8)
WHOLE BLOOD HOLD COAG: NORMAL
WHOLE BLOOD HOLD SPECIMEN: NORMAL

## 2025-05-04 PROCEDURE — 36415 COLL VENOUS BLD VENIPUNCTURE: CPT

## 2025-05-04 PROCEDURE — 80053 COMPREHEN METABOLIC PANEL: CPT | Performed by: EMERGENCY MEDICINE

## 2025-05-04 PROCEDURE — 96361 HYDRATE IV INFUSION ADD-ON: CPT

## 2025-05-04 PROCEDURE — G0378 HOSPITAL OBSERVATION PER HR: HCPCS

## 2025-05-04 PROCEDURE — 83690 ASSAY OF LIPASE: CPT | Performed by: EMERGENCY MEDICINE

## 2025-05-04 PROCEDURE — 74177 CT ABD & PELVIS W/CONTRAST: CPT

## 2025-05-04 PROCEDURE — 85025 COMPLETE CBC W/AUTO DIFF WBC: CPT | Performed by: EMERGENCY MEDICINE

## 2025-05-04 PROCEDURE — 81003 URINALYSIS AUTO W/O SCOPE: CPT | Performed by: EMERGENCY MEDICINE

## 2025-05-04 PROCEDURE — 96374 THER/PROPH/DIAG INJ IV PUSH: CPT

## 2025-05-04 PROCEDURE — 25510000001 IOPAMIDOL 61 % SOLUTION: Performed by: EMERGENCY MEDICINE

## 2025-05-04 PROCEDURE — 80307 DRUG TEST PRSMV CHEM ANLYZR: CPT

## 2025-05-04 PROCEDURE — 84484 ASSAY OF TROPONIN QUANT: CPT | Performed by: EMERGENCY MEDICINE

## 2025-05-04 PROCEDURE — 25010000002 KETOROLAC TROMETHAMINE PER 15 MG: Performed by: EMERGENCY MEDICINE

## 2025-05-04 PROCEDURE — 96375 TX/PRO/DX INJ NEW DRUG ADDON: CPT

## 2025-05-04 PROCEDURE — 25010000002 DROPERIDOL PER 5 MG: Performed by: EMERGENCY MEDICINE

## 2025-05-04 PROCEDURE — 25010000002 ONDANSETRON PER 1 MG: Performed by: EMERGENCY MEDICINE

## 2025-05-04 PROCEDURE — 0202U NFCT DS 22 TRGT SARS-COV-2: CPT

## 2025-05-04 PROCEDURE — 99283 EMERGENCY DEPT VISIT LOW MDM: CPT

## 2025-05-04 PROCEDURE — 25010000002 FAMOTIDINE 10 MG/ML SOLUTION: Performed by: EMERGENCY MEDICINE

## 2025-05-04 PROCEDURE — 99285 EMERGENCY DEPT VISIT HI MDM: CPT

## 2025-05-04 PROCEDURE — 25010000002 METOCLOPRAMIDE PER 10 MG: Performed by: EMERGENCY MEDICINE

## 2025-05-04 PROCEDURE — 25810000003 SODIUM CHLORIDE 0.9 % SOLUTION: Performed by: EMERGENCY MEDICINE

## 2025-05-04 PROCEDURE — 83735 ASSAY OF MAGNESIUM: CPT | Performed by: EMERGENCY MEDICINE

## 2025-05-04 PROCEDURE — 84703 CHORIONIC GONADOTROPIN ASSAY: CPT | Performed by: EMERGENCY MEDICINE

## 2025-05-04 PROCEDURE — 25010000002 HALOPERIDOL LACTATE PER 5 MG: Performed by: EMERGENCY MEDICINE

## 2025-05-04 PROCEDURE — 25010000002 DIPHENHYDRAMINE PER 50 MG: Performed by: EMERGENCY MEDICINE

## 2025-05-04 RX ORDER — FAMOTIDINE 10 MG/ML
20 INJECTION, SOLUTION INTRAVENOUS ONCE
Status: COMPLETED | OUTPATIENT
Start: 2025-05-04 | End: 2025-05-04

## 2025-05-04 RX ORDER — DIPHENHYDRAMINE HYDROCHLORIDE 50 MG/ML
25 INJECTION, SOLUTION INTRAMUSCULAR; INTRAVENOUS ONCE
Status: COMPLETED | OUTPATIENT
Start: 2025-05-04 | End: 2025-05-04

## 2025-05-04 RX ORDER — SODIUM CHLORIDE 9 MG/ML
999 INJECTION, SOLUTION INTRAVENOUS ONCE
Status: COMPLETED | OUTPATIENT
Start: 2025-05-04 | End: 2025-05-04

## 2025-05-04 RX ORDER — METOCLOPRAMIDE 5 MG/1
5 TABLET ORAL 3 TIMES DAILY PRN
Qty: 10 TABLET | Refills: 0 | Status: SHIPPED | OUTPATIENT
Start: 2025-05-04

## 2025-05-04 RX ORDER — DROPERIDOL 2.5 MG/ML
1.25 INJECTION, SOLUTION INTRAMUSCULAR; INTRAVENOUS ONCE
Status: COMPLETED | OUTPATIENT
Start: 2025-05-04 | End: 2025-05-04

## 2025-05-04 RX ORDER — SODIUM CHLORIDE 0.9 % (FLUSH) 0.9 %
10 SYRINGE (ML) INJECTION AS NEEDED
Status: DISCONTINUED | OUTPATIENT
Start: 2025-05-04 | End: 2025-05-04 | Stop reason: HOSPADM

## 2025-05-04 RX ORDER — ONDANSETRON 4 MG/1
4 TABLET, ORALLY DISINTEGRATING ORAL EVERY 6 HOURS PRN
Status: DISCONTINUED | OUTPATIENT
Start: 2025-05-04 | End: 2025-05-05 | Stop reason: HOSPADM

## 2025-05-04 RX ORDER — IOPAMIDOL 612 MG/ML
85 INJECTION, SOLUTION INTRAVASCULAR
Status: COMPLETED | OUTPATIENT
Start: 2025-05-04 | End: 2025-05-04

## 2025-05-04 RX ORDER — METOCLOPRAMIDE HYDROCHLORIDE 5 MG/ML
10 INJECTION INTRAMUSCULAR; INTRAVENOUS ONCE
Status: COMPLETED | OUTPATIENT
Start: 2025-05-04 | End: 2025-05-04

## 2025-05-04 RX ORDER — ONDANSETRON 2 MG/ML
4 INJECTION INTRAMUSCULAR; INTRAVENOUS ONCE
Status: COMPLETED | OUTPATIENT
Start: 2025-05-04 | End: 2025-05-04

## 2025-05-04 RX ORDER — HYDROXYZINE HYDROCHLORIDE 25 MG/1
25 TABLET, FILM COATED ORAL 3 TIMES DAILY PRN
Status: DISCONTINUED | OUTPATIENT
Start: 2025-05-04 | End: 2025-05-05 | Stop reason: HOSPADM

## 2025-05-04 RX ORDER — DROPERIDOL 2.5 MG/ML
1.25 INJECTION, SOLUTION INTRAMUSCULAR; INTRAVENOUS ONCE AS NEEDED
Status: DISCONTINUED | OUTPATIENT
Start: 2025-05-04 | End: 2025-05-05 | Stop reason: HOSPADM

## 2025-05-04 RX ORDER — LEVOTHYROXINE SODIUM 50 UG/1
50 TABLET ORAL DAILY
Status: DISCONTINUED | OUTPATIENT
Start: 2025-05-04 | End: 2025-05-05 | Stop reason: HOSPADM

## 2025-05-04 RX ORDER — SODIUM CHLORIDE 0.9 % (FLUSH) 0.9 %
10 SYRINGE (ML) INJECTION AS NEEDED
Status: DISCONTINUED | OUTPATIENT
Start: 2025-05-04 | End: 2025-05-04

## 2025-05-04 RX ORDER — SODIUM CHLORIDE 9 MG/ML
100 INJECTION, SOLUTION INTRAVENOUS CONTINUOUS
Status: DISCONTINUED | OUTPATIENT
Start: 2025-05-04 | End: 2025-05-05 | Stop reason: HOSPADM

## 2025-05-04 RX ORDER — METOPROLOL SUCCINATE 25 MG/1
25 TABLET, EXTENDED RELEASE ORAL DAILY
Status: DISCONTINUED | OUTPATIENT
Start: 2025-05-04 | End: 2025-05-05 | Stop reason: HOSPADM

## 2025-05-04 RX ORDER — BISACODYL 10 MG
10 SUPPOSITORY, RECTAL RECTAL DAILY PRN
Status: DISCONTINUED | OUTPATIENT
Start: 2025-05-04 | End: 2025-05-05 | Stop reason: HOSPADM

## 2025-05-04 RX ORDER — SODIUM CHLORIDE 9 MG/ML
40 INJECTION, SOLUTION INTRAVENOUS AS NEEDED
Status: DISCONTINUED | OUTPATIENT
Start: 2025-05-04 | End: 2025-05-05 | Stop reason: HOSPADM

## 2025-05-04 RX ORDER — SODIUM CHLORIDE 0.9 % (FLUSH) 0.9 %
10 SYRINGE (ML) INJECTION EVERY 12 HOURS SCHEDULED
Status: DISCONTINUED | OUTPATIENT
Start: 2025-05-04 | End: 2025-05-04

## 2025-05-04 RX ORDER — BISACODYL 5 MG/1
5 TABLET, DELAYED RELEASE ORAL DAILY PRN
Status: DISCONTINUED | OUTPATIENT
Start: 2025-05-04 | End: 2025-05-05 | Stop reason: HOSPADM

## 2025-05-04 RX ORDER — ONDANSETRON 2 MG/ML
4 INJECTION INTRAMUSCULAR; INTRAVENOUS EVERY 6 HOURS PRN
Status: DISCONTINUED | OUTPATIENT
Start: 2025-05-04 | End: 2025-05-05 | Stop reason: HOSPADM

## 2025-05-04 RX ORDER — SODIUM CHLORIDE 0.9 % (FLUSH) 0.9 %
10 SYRINGE (ML) INJECTION AS NEEDED
Status: DISCONTINUED | OUTPATIENT
Start: 2025-05-04 | End: 2025-05-05 | Stop reason: HOSPADM

## 2025-05-04 RX ORDER — PANTOPRAZOLE SODIUM 40 MG/1
40 TABLET, DELAYED RELEASE ORAL DAILY
Status: DISCONTINUED | OUTPATIENT
Start: 2025-05-05 | End: 2025-05-05 | Stop reason: HOSPADM

## 2025-05-04 RX ORDER — POLYETHYLENE GLYCOL 3350 17 G/17G
17 POWDER, FOR SOLUTION ORAL DAILY PRN
Status: DISCONTINUED | OUTPATIENT
Start: 2025-05-04 | End: 2025-05-05 | Stop reason: HOSPADM

## 2025-05-04 RX ORDER — AMOXICILLIN 250 MG
2 CAPSULE ORAL 2 TIMES DAILY PRN
Status: DISCONTINUED | OUTPATIENT
Start: 2025-05-04 | End: 2025-05-05 | Stop reason: HOSPADM

## 2025-05-04 RX ORDER — KETOROLAC TROMETHAMINE 30 MG/ML
30 INJECTION, SOLUTION INTRAMUSCULAR; INTRAVENOUS ONCE
Status: COMPLETED | OUTPATIENT
Start: 2025-05-04 | End: 2025-05-04

## 2025-05-04 RX ORDER — HALOPERIDOL 5 MG/ML
2 INJECTION INTRAMUSCULAR ONCE
Status: COMPLETED | OUTPATIENT
Start: 2025-05-04 | End: 2025-05-04

## 2025-05-04 RX ADMIN — KETOROLAC TROMETHAMINE 30 MG: 30 INJECTION, SOLUTION INTRAMUSCULAR; INTRAVENOUS at 01:33

## 2025-05-04 RX ADMIN — SODIUM CHLORIDE 100 ML/HR: 9 INJECTION, SOLUTION INTRAVENOUS at 20:05

## 2025-05-04 RX ADMIN — ONDANSETRON 4 MG: 2 INJECTION, SOLUTION INTRAMUSCULAR; INTRAVENOUS at 01:25

## 2025-05-04 RX ADMIN — Medication 10 ML: at 12:19

## 2025-05-04 RX ADMIN — SODIUM CHLORIDE 1000 ML: 0.9 INJECTION, SOLUTION INTRAVENOUS at 02:47

## 2025-05-04 RX ADMIN — METOCLOPRAMIDE 10 MG: 5 INJECTION, SOLUTION INTRAMUSCULAR; INTRAVENOUS at 02:01

## 2025-05-04 RX ADMIN — FAMOTIDINE 20 MG: 10 INJECTION, SOLUTION INTRAVENOUS at 01:33

## 2025-05-04 RX ADMIN — SODIUM CHLORIDE 999 ML/HR: 0.9 INJECTION, SOLUTION INTRAVENOUS at 01:25

## 2025-05-04 RX ADMIN — METOPROLOL SUCCINATE 25 MG: 25 TABLET, EXTENDED RELEASE ORAL at 20:05

## 2025-05-04 RX ADMIN — HALOPERIDOL LACTATE 2 MG: 5 INJECTION, SOLUTION INTRAMUSCULAR at 02:48

## 2025-05-04 RX ADMIN — SODIUM CHLORIDE 2301 ML: 9 INJECTION, SOLUTION INTRAVENOUS at 12:16

## 2025-05-04 RX ADMIN — IOPAMIDOL 85 ML: 612 INJECTION, SOLUTION INTRAVENOUS at 13:56

## 2025-05-04 RX ADMIN — DIPHENHYDRAMINE HYDROCHLORIDE 25 MG: 50 INJECTION, SOLUTION INTRAMUSCULAR; INTRAVENOUS at 12:17

## 2025-05-04 RX ADMIN — DROPERIDOL 1.25 MG: 2.5 INJECTION, SOLUTION INTRAMUSCULAR; INTRAVENOUS at 12:18

## 2025-05-04 RX ADMIN — LEVOTHYROXINE SODIUM 50 MCG: 50 TABLET ORAL at 20:04

## 2025-05-04 NOTE — LETTER
May 5, 2025     Patient: Brianne Mendoza   YOB: 1980   Date of Visit: 5/4/2025       To Whom It May Concern:    Brianne Mendoza was seen and examined in Observations unit from May 4, 2025 to May 5, 2025  It is my medical opinion that she may return to work on May 7. 2025.           Sincerely,      Tina Sarkar PA-C    CC: No Recipients

## 2025-05-04 NOTE — H&P
[facility]   HISTORY AND PHYSICAL    Patient Name: Brianne Mendoza  : 1980  MRN: [id number]  Primary Care Physician:  Roslyn Gandhi APRN  Date of admission: 2025    Subjective   Subjective     Chief Complaint:   Chief Complaint   Patient presents with    Vomiting         HPI:    Brianne Mendoza is a 44 y.o. female with medical history significant for hyperlipidemia, hypothyroidism, presents with N/V/D since Wednesday evening.  She was initially seen at the [facility] ED at around 1 AM and had an unremarkable CBC.  CMP show anion gap was 17.7, glucose 159, AST 54, ALT 77.  hCG and lipase were negative.  She has not picked up her prescribed Reglan from previous ED visit.  She returned to the ED around noon for persistent nausea and vomiting, she has not had a bowel movement since early morning today.  She denies sick contacts, denies fever, sore throat, SOA, cough.  Denies abdominal pain.  On exam Lu sign and psoas sign negative.  Abdomen is nondistended, nontender.  She denies bloody or bilious emesis/stool.    Review of Systems   All systems were reviewed and negative except for: N/V/D      Personal History     Past Medical History:   Diagnosis Date    Abnormal ECG     Arrhythmia    Anxiety     Arrhythmia     Clotting disorder     Think from hemorroids    Fissure, anal 2021    GERD (gastroesophageal reflux disease)     Hyperlipidemia     Hypothyroidism 2018    Irregular heart beats     followed by Dr. Cosme.. EKG/ECHO done 2022 on file.    Kidney stone 1998       Past Surgical History:   Procedure Laterality Date     SECTION  2021    COLONOSCOPY  2024    Normal    D & C WITH SUCTION N/A 2024    Procedure: SUCTION DILATATION AND CURETTAGE WITH ULTRASOUND AVAILABLE;  Surgeon: Rubi Jara MD;  Location: [facility];  Service: Obstetrics/Gynecology;  Laterality: N/A;    KIDNEY STONE SURGERY      [facility]  2018       Family History: family history includes  Diabetes in her sister and sister; Heart attack in her maternal aunt and maternal grandmother; Irritable bowel syndrome in her mother. Otherwise pertinent FHx was reviewed and not pertinent to current issue.    Social History:  reports that she has never smoked. She has never used smokeless tobacco. She reports current alcohol use of about 6.0 standard drinks of alcohol per week. She reports that she does not use drugs.    Home Medications:  Prenatal Vit-Fe Fumarate-FA, Semaglutide, hydrOXYzine, levothyroxine, metoclopramide, metoprolol succinate XL, norethindrone-ethinyl estradiol-ferrous fumarate, pantoprazole, tretinoin, and valACYclovir    Allergies:  No Known Allergies    Objective   Objective     Vitals:   Temp:  [97 °F (36.1 °C)-98.8 °F (37.1 °C)] 98.8 °F (37.1 °C)  Heart Rate:  [] 77  Resp:  [16-19] 16  BP: ()/(63-85) 120/77   Physical Exam:     Constitutional: Awake, alert. Well developed for age. Nontoxic appearing.   Eyes: PERRL x2, sclerae anicteric, no conjunctival injection. No EOM abnormalities.   HENT: NCAT, mucous membranes moist,   Neck: Supple, no thyromegaly, no lymphadenopathy, trachea midline  Respiratory: Clear to auscultation bilaterally, nonlabored respirations   Cardiovascular: RRR, no murmurs, rubs, or gallops, palpable pedal pulses bilaterally. No appreciable edema.   Gastrointestinal: Positive bowel sounds, soft, nontender, not distended.   Musculoskeletal: No bilateral ankle edema, no clubbing or cyanosis to extremities. No obvious deformities.   Psychiatric: Appropriate affect, cooperative. Converses appropriately for age.   Neurologic: Oriented x 3, strength symmetric in all extremities. Cranial nerves grossly intact to confrontation, speech clear  Skin: No rashes, skin intact.     Result Review    Result Review:  I have personally reviewed the results from the time of this admission to 5/4/2025 19:31 EDT and agree with these findings:  [x]  Laboratory list / accordion  []   Microbiology  [x]  Radiology  []  EKG/Telemetry   []  Cardiology/Vascular   []  Pathology  [x]  Old records  []  Other:  Most notable findings include:     Repeat CBC CMP and lipase are not significantly changed.  High-sensitivity troponin T is less than 6, trending flat.  CT A/P show diverticulosis without inflammatory process, nonobstructive right nephrolithiasis is noted.  UA is bland, positive for 2+ ketones.      Assessment & Plan   Assessment / Plan     Brief Patient Summary:  Brianne Mendoza is a 44 y.o. female who was seen at a freestanding ED in [facility] for N/V/D since Wednesday.  She denies sick contacts, denies fever, SOA, cough, or sore throat.  Her CT A/P is negative for acute findings and CBC, lipase, UA are unremarkable.  Potassium is 3.4, AST is 39, ALT 67.     Active Hospital Problems:  Active Hospital Problems    Diagnosis     **Intractable nausea and vomiting      Plan:     Persistent N/V/D  - CT A/P negative for acute findings  - Troponin flat  - UA bland  - Check RVP, GI PCR  - Antiemetics PRN  - GI consult    VTE Prophylaxis:  Mechanical VTE prophylaxis orders are present.        CODE STATUS:    Code Status (Patient has no pulse and is not breathing): CPR (Attempt to Resuscitate)  Medical Interventions (Patient has pulse or is breathing): Full Support  Level Of Support Discussed With: Patient    Admission Status:  I believe this patient meets observation status.    Electronically signed by MARCOS Dalal, 05/04/25, 7:31 PM EDT.        80 minutes has been spent by [facility] providers in the care of this patient while under observation status on this date 05/04/25      I have worn appropriate PPE during this patient encounter, sanitized my hands both with entering and exiting patient's room.    I have discussed plan of care with patient including advance care plan and/or surrogate decision maker.  Patient advises that their mother, Pam  Jose Alejandro, will be their primary surrogate decision maker

## 2025-05-04 NOTE — ED NOTES
"Nursing report ED to floor  Brianne Mendoza  44 y.o.  female    HPI :  HPI  Stated Reason for Visit: n/v    Chief Complaint  Chief Complaint   Patient presents with    Vomiting       Admitting doctor:   Lawrence Kenney MD    Admitting diagnosis:   The primary encounter diagnosis was Abdominal pain, vomiting, and diarrhea. Diagnoses of Metabolic acidosis, increased anion gap, Hypokalemia, and Abnormal liver function tests were also pertinent to this visit.    Code status:   Current Code Status       Date Active Code Status Order ID Comments User Context       Not on file            Allergies:   Patient has no known allergies.    Isolation:   No active isolations    Intake and Output  No intake or output data in the 24 hours ending 05/04/25 1729    Weight:       05/04/25  1153   Weight: 76.7 kg (169 lb)       Most recent vitals:   Vitals:    05/04/25 1153 05/04/25 1333 05/04/25 1531 05/04/25 1701   BP:  116/64 100/63 99/65   Pulse:  80 96 77   Resp:  16 16 16   Temp:       TempSrc:       SpO2:  99% 96% 96%   Weight: 76.7 kg (169 lb)      Height: 152.4 cm (60\")          Active LDAs/IV Access:   Lines, Drains & Airways       Active LDAs       Name Placement date Placement time Site Days    Peripheral IV 05/04/25 1152 20 G Anterior;Left;Proximal Forearm 05/04/25  1152  Forearm  less than 1                    Labs (abnormal labs have a star):   Labs Reviewed   URINALYSIS W/ MICROSCOPIC IF INDICATED (NO CULTURE) - Abnormal; Notable for the following components:       Result Value    Glucose,  mg/dL (Trace) (*)     Ketones, UA 40 mg/dL (2+) (*)     Protein, UA Trace (*)     All other components within normal limits    Narrative:     Urine microscopic not indicated.   COMPREHENSIVE METABOLIC PANEL - Abnormal; Notable for the following components:    Glucose 131 (*)     BUN 4 (*)     Sodium 135 (*)     Potassium 3.4 (*)     CO2 18.6 (*)     Calcium 8.2 (*)     ALT (SGPT) 67 (*)     AST (SGOT) 39 (*)     BUN/Creatinine " Ratio 4.9 (*)     Anion Gap 16.4 (*)     All other components within normal limits    Narrative:     GFR Categories in Chronic Kidney Disease (CKD)              GFR Category          GFR (mL/min/1.73)    Interpretation  G1                    90 or greater        Normal or high (1)  G2                    60-89                Mild decrease (1)  G3a                   45-59                Mild to moderate decrease  G3b                   30-44                Moderate to severe decrease  G4                    15-29                Severe decrease  G5                    14 or less           Kidney failure    (1)In the absence of evidence of kidney disease, neither GFR category G1 or G2 fulfill the criteria for CKD.    eGFR calculation 2021 CKD-EPI creatinine equation, which does not include race as a factor   CBC WITH AUTO DIFFERENTIAL - Abnormal; Notable for the following components:    RDW 12.2 (*)     Neutrophil % 88.4 (*)     Lymphocyte % 9.7 (*)     Monocyte % 1.5 (*)     Eosinophil % 0.0 (*)     Lymphocytes, Absolute 0.67 (*)     All other components within normal limits   LIPASE - Normal   MAGNESIUM - Normal   TROPONIN - Normal    Narrative:     High Sensitive Troponin T Reference Range:  <14.0 ng/L- Negative Female for AMI  <22.0 ng/L- Negative Male for AMI  >=14 - Abnormal Female indicating possible myocardial injury.  >=22 - Abnormal Male indicating possible myocardial injury.   Clinicians would have to utilize clinical acumen, EKG, Troponin, and serial changes to determine if it is an Acute Myocardial Infarction or myocardial injury due to an underlying chronic condition.        GASTROINTESTINAL PANEL, PCR (PREFERRED) DOES NOT INCLUDE CDIFF   RAINBOW DRAW    Narrative:     The following orders were created for panel order Max Draw.  Procedure                               Abnormality         Status                     ---------                               -----------         ------                     Green  Top (Gel)[[id number]]                                  Final result               Lavender Top[[id number]]                                     Final result               Gold Top - SST[[id number]]                                   Final result               Light Blue Top[[id number]]                                   Final result                 Please view results for these tests on the individual orders.   HIGH SENSITIVITIY TROPONIN T 1HR    Narrative:     High Sensitive Troponin T Reference Range:  <14.0 ng/L- Negative Female for AMI  <22.0 ng/L- Negative Male for AMI  >=14 - Abnormal Female indicating possible myocardial injury.  >=22 - Abnormal Male indicating possible myocardial injury.   Clinicians would have to utilize clinical acumen, EKG, Troponin, and serial changes to determine if it is an Acute Myocardial Infarction or myocardial injury due to an underlying chronic condition.        GREEN TOP   LAVENDER TOP   GOLD TOP - SST   LIGHT BLUE TOP   CBC AND DIFFERENTIAL    Narrative:     The following orders were created for panel order CBC & Differential.  Procedure                               Abnormality         Status                     ---------                               -----------         ------                     CBC Auto Differential[[id number]]        Abnormal            Final result                 Please view results for these tests on the individual orders.       EKG:   No orders to display       Meds given in ED:   Medications   sodium chloride 0.9 % flush 10 mL (has no administration in time range)   sodium chloride 0.9 % flush 10 mL (10 mL Intravenous Given 5/4/25 1219)   sodium chloride 0.9 % bolus 2,301 mL (2,301 mL Intravenous New Bag 5/4/25 1216)   droperidol (INAPSINE) injection 1.25 mg (1.25 mg Intravenous Given 5/4/25 1218)   diphenhydrAMINE (BENADRYL) injection 25 mg (25 mg Intravenous Given 5/4/25 1217)   iopamidol (ISOVUE-300) 61 % injection 85 mL (85 mL Intravenous Given 5/4/25  1356)       Imaging results:  CT Abdomen Pelvis With Contrast  Result Date: 5/4/2025    1. Colonic diverticulosis. No acute inflammatory process of bowel is identified. Follow-up as indications persist.  2. Nonobstructive right nephrolithiasis. No hydronephrosis.  This report was finalized on 5/4/2025 2:13 PM by Dr. Roly Rogers M.D on Workstation: [id number]        Ambulatory status:   - independent     Social issues:   Social History     Socioeconomic History    Marital status:    Tobacco Use    Smoking status: Never    Smokeless tobacco: Never    Tobacco comments:     Caffeine - coffee on occas.    Vaping Use    Vaping status: Never Used   Substance and Sexual Activity    Alcohol use: Yes     Alcohol/week: 6.0 standard drinks of alcohol     Types: 4 Glasses of wine, 2 Drinks containing 0.5 oz of alcohol per week     Comment: 2 -3 days a week    Drug use: Never    Sexual activity: Yes     Partners: Male     Birth control/protection: Birth control pill       Peripheral Neurovascular  Peripheral Neurovascular (Adult)  Peripheral Neurovascular WDL: WDL    Neuro Cognitive  Neuro Cognitive (Adult)  Cognitive/Neuro/Behavioral WDL: WDL    Learning  Learning Assessment  Learning Readiness and Ability: no barriers identified    Respiratory  Respiratory WDL  Respiratory WDL: WDL    Abdominal Pain       Pain Assessments  Pain (Adult)  (0-10) Pain Rating: Rest: 0    NIH Stroke Scale       Sia Bolaños RN  05/04/25 17:29 EDT

## 2025-05-04 NOTE — PROGRESS NOTES
THEODORE RANDALL ATTESTATION NOTE    The ADEOLA and I have discussed this patient's history, physical exam, and treatment plan.  I have reviewed the documentation and personally had a face to face interaction with the patient. I affirm the documentation and agree with the treatment and plan.  The attached note describes my personal findings.      I provided a substantive portion of the care of this patient. I personally performed the physical exam, in its entirety. I personally spent 24 minutes on the care of this patient today.    Brianne Mendoza is a 44 y.o. female who presented to the emergency department today complaining of nausea and vomiting. She had been in the ED last night for the same. She had persistent nausea and vomiting. The patient required admission to the observation unit for further evaluation and management.  She had stable creatinine.  She had 2 negative troponins.  She had normal CBC.  CT ab pelvis showed no acute inflammatory process.  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  She is on semaglutide and had some social drinking this weekend.  She has significant improvement with antiemetics here.  Plan to continue IV fluids, obtain stool studies.  Plan GI consult.    My independent interpretation of the CT of the abdomen pelvis is no bowel obstruction      On exam:  GENERAL: Awake, alert, no acute distress  SKIN: Warm, dry  HENT: Normocephalic, atraumatic  EYES: no scleral icterus  CV: regular rhythm, regular rate  RESPIRATORY: normal effort, lungs clear  ABDOMEN: soft, nontender, nondistended  MUSCULOSKELETAL: no deformity  NEURO: alert, moves all extremities, follows commands          Note Disclaimer: At [facility], we believe that sharing information builds trust and better relationships. You are receiving this note because you recently visited [facility]. It is possible you will see health information before a provider has talked with you about it. This kind of information can be easy to  misunderstand. To help you fully understand what it means for your health, we urge you to discuss this note with your provider.

## 2025-05-04 NOTE — ED PROVIDER NOTES
EMERGENCY DEPARTMENT ENCOUNTER    Room Number:  101/1  Date of encounter:  2025  PCP: Roslyn Gandhi APRN  Historian: Patient and mother  Relevant information and history provided by sources other than the patient will be included below and in the ED Course.  Review of pertinent past medical records may also be included in record below and ED Course.    HPI:  Chief Complaint: Persistent nausea and vomiting  A complete HPI/ROS/PMH/PSH/SH/FH are unobtainable due to: Not applicable  Context: Brianne Mendoza is a 44 y.o. female who presents to the ED c/o today it is Jus, May 4.  Symptoms started on Wednesday evening.  Started with some nausea vomiting and diarrhea.  Had 2 large episodes of diarrhea.  Since that time he still had not normal stools any been loose but have been occurring infrequently.  Her last bowel movement was this morning and it was loose.  The nausea and vomiting have persisted.  She did go to therapy and did drink several drinks of alcohol that day.  Did have alcohol drinks on Friday at the [facility] as well as on Saturday at the [facility].  But to a much smaller degree.  Still having this persistent nausea and vomiting.  Went to [facility] emergency department because of the persistence and worsening of the nausea and vomiting and persistent dry heaving.  She was treated and she temporarily had some relief but then the symptoms returned and persisted this morning.  Please see medical history reviewed below.  She has had no fever.  She has had some nausea and when she does vomit and has been nauseous she has a little bit of sweating and chills.  She is not aware of any antibiotics that she is taken recently.  She did travel in the first week of April to [place].  Otherwise no travel.  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  Potentially ill contacts with nausea vomiting diarrhea but not aware of anything that she specifically remembers.  She has had a  but no other  previous abdominal surgery.  Really does not complain of abdominal pain but really pronounced and severe nausea.  Denies any dysuria.  Denies chest pain or shortness of breath.  She is on semaglutide for weight loss.  Never had episodes similar to this in the past.  There was no blood in the emesis or in the diarrhea        Previous Episodes: No  Current Symptoms: Nausea and dry heaving.    MEDICAL HISTORY REVIEWED  Patient was seen at the [facility] emergency department late last night and early this morning for nausea and vomiting.  She did drink alcohol earlier in the day.  She has a history of anxiety, GERD, hyperlipidemia hype with hypothyroidism and kidney stones.  She appears to be treated with Haldol, Reglan, Pepcid and Zofran.  I reviewed patient's lab work.  Patient had a CO2 of 18.3 and anion gap of 17.7 at 120 this morning.  Mild elevation in liver function test with a AST of 54 and an ALT of 77 lipase was 25 pregnancy test was normal white blood cell count was normal.  I do not see any imaging performed.      PAST MEDICAL HISTORY  Active Ambulatory Problems     Diagnosis Date Noted    Hypothyroidism 2018    Palpitations 2018    PVC (premature ventricular contraction) 2022    Hyperlipidemia 2022    Miscarriage 2024    Advanced maternal age in multigravida 2024    Anxiety 2025    Cold sore 2025    History of loop electrosurgical excision procedure (LEEP) 2025    History of renal calculi 2025     Resolved Ambulatory Problems     Diagnosis Date Noted    No Resolved Ambulatory Problems     Past Medical History:   Diagnosis Date    Abnormal ECG     Arrhythmia     Clotting disorder     Fissure, anal 2021    GERD (gastroesophageal reflux disease)     Irregular heart beats     Kidney stone 1998         PAST SURGICAL HISTORY  Past Surgical History:   Procedure Laterality Date     SECTION  2021    COLONOSCOPY  2024    Normal    D & C  WITH SUCTION N/A 01/12/2024    Procedure: SUCTION DILATATION AND CURETTAGE WITH ULTRASOUND AVAILABLE;  Surgeon: Rubi Jara MD;  Location: [facility] MAIN OR;  Service: Obstetrics/Gynecology;  Laterality: N/A;    KIDNEY STONE SURGERY      LEE  2018         FAMILY HISTORY  Family History   Problem Relation Age of Onset    Diabetes Sister         Type 1    Irritable bowel syndrome Mother     Heart attack Maternal Grandmother     Diabetes Sister         Type 1    Heart attack Maternal Aunt     Malig Hyperthermia Neg Hx          SOCIAL HISTORY  Social History     Socioeconomic History    Marital status:    Tobacco Use    Smoking status: Never    Smokeless tobacco: Never    Tobacco comments:     Caffeine - coffee on occas.    Vaping Use    Vaping status: Never Used   Substance and Sexual Activity    Alcohol use: Yes     Alcohol/week: 6.0 standard drinks of alcohol     Types: 4 Glasses of wine, 2 Drinks containing 0.5 oz of alcohol per week     Comment: 2 -3 days a week    Drug use: Never    Sexual activity: Yes     Partners: Male     Birth control/protection: Birth control pill         ALLERGIES  Patient has no known allergies.        REVIEW OF SYSTEMS  Review of Systems     All systems reviewed and negative except for those discussed in HPI.       PHYSICAL EXAM    I have reviewed the triage vital signs and nursing notes.    ED Triage Vitals   Temp Heart Rate Resp BP SpO2   05/04/25 1122 05/04/25 1122 05/04/25 1122 05/04/25 1133 05/04/25 1122   97 °F (36.1 °C) 112 18 131/74 99 %      Temp src Heart Rate Source Patient Position BP Location FiO2 (%)   05/04/25 1122 05/04/25 1122 -- -- --   Tympanic Monitor          GENERAL: This is a female that has persistent dry heaving and retching.  Very small gastric contents that is liquid and mucousy comes up at times mostly it is dry heaving.  No acute distress.Vital signs on my initial evaluation on my evaluation her heart rate is in the 80s.  O2 sats 100% on room air she  is afebrile normal respiratory rate.  HENT: nares patent  Head/neck/ face are symmetric without gross deformity, signs of trauma, or swelling  EYES: no scleral icterus, no conjunctival pallor.  NECK: Supple, no meningismus  CV: regular rhythm, regular rate with intact distal pulses.  RESPIRATORY: normal effort and no respiratory distress.  Clear to auscultation bilaterally  ABDOMEN: soft and very mild subjective tenderness on palpation to the mid epigastric region.  There is no guarding or rebound.  Normal bowel sounds and normal inspection.  She has no pain in the right upper quadrant on palpation.  MUSCULOSKELETAL: no deformity.  Intact distal pulses that are equal strong and symmetric.  No edema.  No coolness cyanosis or pallor.  NEURO: alert and appropriate, moves all extremities, follows commands.  No focal motor or sensory changes  SKIN: warm, dry    Vital signs and nursing notes reviewed.        LAB RESULTS  Recent Results (from the past 24 hours)   Comprehensive Metabolic Panel    Collection Time: 05/04/25  1:20 AM    Specimen: Blood   Result Value Ref Range    Glucose 159 (H) 65 - 99 mg/dL    BUN 5 (L) 6 - 20 mg/dL    Creatinine 0.84 0.57 - 1.00 mg/dL    Sodium 138 136 - 145 mmol/L    Potassium 3.7 3.5 - 5.2 mmol/L    Chloride 102 98 - 107 mmol/L    CO2 18.3 (L) 22.0 - 29.0 mmol/L    Calcium 9.2 8.6 - 10.5 mg/dL    Total Protein 7.3 6.0 - 8.5 g/dL    Albumin 4.6 3.5 - 5.2 g/dL    ALT (SGPT) 77 (H) 1 - 33 U/L    AST (SGOT) 54 (H) 1 - 32 U/L    Alkaline Phosphatase 62 39 - 117 U/L    Total Bilirubin 0.9 0.0 - 1.2 mg/dL    Globulin 2.7 gm/dL    A/G Ratio 1.7 g/dL    BUN/Creatinine Ratio 6.0 (L) 7.0 - 25.0    Anion Gap 17.7 (H) 5.0 - 15.0 mmol/L    eGFR 88.6 >60.0 mL/min/1.73   hCG, Serum, Qualitative    Collection Time: 05/04/25  1:20 AM    Specimen: Blood   Result Value Ref Range    HCG Qualitative Negative Negative   CBC Auto Differential    Collection Time: 05/04/25  1:20 AM    Specimen: Blood   Result  Value Ref Range    WBC 9.87 3.40 - 10.80 10*3/mm3    RBC 4.14 3.77 - 5.28 10*6/mm3    Hemoglobin 13.2 12.0 - 15.9 g/dL    Hematocrit 38.9 34.0 - 46.6 %    MCV 94.0 79.0 - 97.0 fL    MCH 31.9 26.6 - 33.0 pg    MCHC 33.9 31.5 - 35.7 g/dL    RDW 12.4 12.3 - 15.4 %    RDW-SD 43.6 37.0 - 54.0 fl    MPV 9.9 6.0 - 12.0 fL    Platelets 303 140 - 450 10*3/mm3    Neutrophil % 75.1 42.7 - 76.0 %    Lymphocyte % 17.6 (L) 19.6 - 45.3 %    Monocyte % 5.8 5.0 - 12.0 %    Eosinophil % 0.9 0.3 - 6.2 %    Basophil % 0.4 0.0 - 1.5 %    Immature Grans % 0.2 0.0 - 0.5 %    Neutrophils, Absolute 7.41 (H) 1.70 - 7.00 10*3/mm3    Lymphocytes, Absolute 1.74 0.70 - 3.10 10*3/mm3    Monocytes, Absolute 0.57 0.10 - 0.90 10*3/mm3    Eosinophils, Absolute 0.09 0.00 - 0.40 10*3/mm3    Basophils, Absolute 0.04 0.00 - 0.20 10*3/mm3    Immature Grans, Absolute 0.02 0.00 - 0.05 10*3/mm3   Lipase    Collection Time: 05/04/25  1:20 AM    Specimen: Blood   Result Value Ref Range    Lipase 25 13 - 60 U/L   Green Top (Gel)    Collection Time: 05/04/25 11:50 AM   Result Value Ref Range    Extra Tube Hold for add-ons.    Lavender Top    Collection Time: 05/04/25 11:50 AM   Result Value Ref Range    Extra Tube hold for add-on    Gold Top - SST    Collection Time: 05/04/25 11:50 AM   Result Value Ref Range    Extra Tube Hold for add-ons.    Light Blue Top    Collection Time: 05/04/25 11:50 AM   Result Value Ref Range    Extra Tube Hold for add-ons.    Urinalysis With Microscopic If Indicated (No Culture) - Urine, Clean Catch    Collection Time: 05/04/25 11:50 AM    Specimen: Urine, Clean Catch   Result Value Ref Range    Color, UA Yellow Yellow, Straw    Appearance, UA Clear Clear    pH, UA 7.0 5.0 - 8.0    Specific Gravity, UA 1.021 1.005 - 1.030    Glucose,  mg/dL (Trace) (A) Negative    Ketones, UA 40 mg/dL (2+) (A) Negative    Bilirubin, UA Negative Negative    Blood, UA Negative Negative    Protein, UA Trace (A) Negative    Leuk Esterase, UA  Negative Negative    Nitrite, UA Negative Negative    Urobilinogen, UA 0.2 E.U./dL 0.2 - 1.0 E.U./dL   Comprehensive Metabolic Panel    Collection Time: 05/04/25 11:50 AM    Specimen: Blood   Result Value Ref Range    Glucose 131 (H) 65 - 99 mg/dL    BUN 4 (L) 6 - 20 mg/dL    Creatinine 0.82 0.57 - 1.00 mg/dL    Sodium 135 (L) 136 - 145 mmol/L    Potassium 3.4 (L) 3.5 - 5.2 mmol/L    Chloride 100 98 - 107 mmol/L    CO2 18.6 (L) 22.0 - 29.0 mmol/L    Calcium 8.2 (L) 8.6 - 10.5 mg/dL    Total Protein 7.4 6.0 - 8.5 g/dL    Albumin 4.4 3.5 - 5.2 g/dL    ALT (SGPT) 67 (H) 1 - 33 U/L    AST (SGOT) 39 (H) 1 - 32 U/L    Alkaline Phosphatase 65 39 - 117 U/L    Total Bilirubin 0.9 0.0 - 1.2 mg/dL    Globulin 3.0 gm/dL    A/G Ratio 1.5 g/dL    BUN/Creatinine Ratio 4.9 (L) 7.0 - 25.0    Anion Gap 16.4 (H) 5.0 - 15.0 mmol/L    eGFR 91.2 >60.0 mL/min/1.73   Lipase    Collection Time: 05/04/25 11:50 AM    Specimen: Blood   Result Value Ref Range    Lipase 22 13 - 60 U/L   Magnesium    Collection Time: 05/04/25 11:50 AM    Specimen: Blood   Result Value Ref Range    Magnesium 1.6 1.6 - 2.6 mg/dL   High Sensitivity Troponin T    Collection Time: 05/04/25 11:50 AM    Specimen: Blood   Result Value Ref Range    HS Troponin T <6 <14 ng/L   CBC Auto Differential    Collection Time: 05/04/25 11:50 AM    Specimen: Blood   Result Value Ref Range    WBC 6.89 3.40 - 10.80 10*3/mm3    RBC 4.21 3.77 - 5.28 10*6/mm3    Hemoglobin 13.3 12.0 - 15.9 g/dL    Hematocrit 39.6 34.0 - 46.6 %    MCV 94.1 79.0 - 97.0 fL    MCH 31.6 26.6 - 33.0 pg    MCHC 33.6 31.5 - 35.7 g/dL    RDW 12.2 (L) 12.3 - 15.4 %    RDW-SD 41.8 37.0 - 54.0 fl    MPV 10.1 6.0 - 12.0 fL    Platelets 302 140 - 450 10*3/mm3    Neutrophil % 88.4 (H) 42.7 - 76.0 %    Lymphocyte % 9.7 (L) 19.6 - 45.3 %    Monocyte % 1.5 (L) 5.0 - 12.0 %    Eosinophil % 0.0 (L) 0.3 - 6.2 %    Basophil % 0.1 0.0 - 1.5 %    Immature Grans % 0.3 0.0 - 0.5 %    Neutrophils, Absolute 6.09 1.70 - 7.00  10*3/mm3    Lymphocytes, Absolute 0.67 (L) 0.70 - 3.10 10*3/mm3    Monocytes, Absolute 0.10 0.10 - 0.90 10*3/mm3    Eosinophils, Absolute 0.00 0.00 - 0.40 10*3/mm3    Basophils, Absolute 0.01 0.00 - 0.20 10*3/mm3    Immature Grans, Absolute 0.02 0.00 - 0.05 10*3/mm3    nRBC 0.0 0.0 - 0.2 /100 WBC   High Sensitivity Troponin T 1Hr    Collection Time: 05/04/25  1:32 PM    Specimen: Arm, Left; Blood   Result Value Ref Range    HS Troponin T <6 <14 ng/L    Troponin T Numeric Delta         Ordered the above labs and independently reviewed the results.        RADIOLOGY  CT Abdomen Pelvis With Contrast  Result Date: 5/4/2025  CT ABDOMEN PELVIS W CONTRAST-  INDICATIONS: Nausea and vomiting  TECHNIQUE: Radiation dose reduction techniques were utilized, including automated exposure control and exposure modulation based on body size. Enhanced ABDOMEN AND PELVIS CT  COMPARISON: None available  FINDINGS:  Nonobstructive right nephrolithiasis is noted, as large as 5 mm.  Otherwise unremarkable appearance of the liver, gallbladder, spleen, adrenal glands, pancreas, kidneys, bladder.  No bowel obstruction. Assessment of colon for wall thickening is limited by lack of distention. Colonic diverticula are seen that do not appear inflamed. Appendix does not appear inflamed.  No free intraperitoneal gas or free fluid.  Scattered small mesenteric and para-aortic lymph nodes are seen that are not significant by size criteria.  Abdominal aorta is not aneurysmal.  The lung bases are clear.  Degenerative changes are seen in the spine. No acute fracture is identified.          1. Colonic diverticulosis. No acute inflammatory process of bowel is identified. Follow-up as indications persist.  2. Nonobstructive right nephrolithiasis. No hydronephrosis.  This report was finalized on 5/4/2025 2:13 PM by Dr. Roly Rogers M.D on Workstation: [email or web address]        I ordered the above noted radiological studies. Reviewed by me and discussed with  radiologist.  See dictation for official radiology interpretation.      PROCEDURES    Procedures      MEDICATIONS GIVEN IN ER    Medications   sodium chloride 0.9 % flush 10 mL (has no administration in time range)   sodium chloride 0.9 % flush 10 mL (10 mL Intravenous Given 5/4/25 1219)   sodium chloride 0.9 % flush 10 mL (has no administration in time range)   sodium chloride 0.9 % flush 10 mL (has no administration in time range)   sodium chloride 0.9 % infusion 40 mL (has no administration in time range)   ondansetron ODT (ZOFRAN-ODT) disintegrating tablet 4 mg (has no administration in time range)     Or   ondansetron (ZOFRAN) injection 4 mg (has no administration in time range)   sennosides-docusate (PERICOLACE) 8.6-50 MG per tablet 2 tablet (has no administration in time range)     And   polyethylene glycol (MIRALAX) packet 17 g (has no administration in time range)     And   bisacodyl (DULCOLAX) EC tablet 5 mg (has no administration in time range)     And   bisacodyl (DULCOLAX) suppository 10 mg (has no administration in time range)   sodium chloride 0.9 % bolus 2,301 mL (2,301 mL Intravenous New Bag 5/4/25 1216)   droperidol (INAPSINE) injection 1.25 mg (1.25 mg Intravenous Given 5/4/25 1218)   diphenhydrAMINE (BENADRYL) injection 25 mg (25 mg Intravenous Given 5/4/25 1217)   iopamidol (ISOVUE-300) 61 % injection 85 mL (85 mL Intravenous Given 5/4/25 1356)         All labs have been independently reviewed by me.  All radiology studies have been reviewed by me and I discussed with radiologist dictating the report when indicated below.  All EKG's independently viewed and interpreted by me.  Discussion below represents my analysis of pertinent findings related to patient's condition, differential diagnosis, treatment plan and final disposition.        PROGRESS, DATA ANALYSIS, CONSULTS, AND MEDICAL DECISION MAKING    Differential diagnosis includes   - hepatobiliary pathology such as cholecystitis, cholangitis,  and symptomatic cholelithiasis  -PUD  -Mesenteric ischemia  - Pancreatitis  - Dyspepsia  - Small bowel or large bowel obstruction  - Appendicitis  - Diverticulitis  - UTI including pyelonephritis  - Ureteral stone  - Zoster  - Colitis, including infectious and ischemic  - Atypical ACS  This is a patient who is having persistent nausea and vomiting.  Initially had a significant amount of diarrhea but that is significantly improved.  We Argun to check a stool culture as well as lab work and do a CT scan of her abdomen pelvis.  Will give her some IV droperidol and IV fluid.  Informed patient and mother of my clinical concerns and the test that we will order.  All questions answered at this time.      ED Course as of 05/04/25 1905   Sun May 04, 2025   1705 CO2(!): 18.6 [MM]   1706 ALT (SGPT)(!): 67 [MM]   1706 AST (SGOT)(!): 39 [MM]   1706 Anion Gap(!): 16.4 [MM]   1706 WBC: 6.89 [MM]   1706 Lipase: 22 [MM]   1706 Ketones, UA(!): 40 mg/dL (2+) [MM]   1706 CT scan of the abdomen and pelvis report was reviewed.  Colonic diverticulosis no acute inflammatory process of the bowel identified.  Patient has right sided nephrolithiasis with no obstructive process no hydronephrosis.  Please see complete dictated report from radiologist [MM]   1710 I have reevaluated the patient.  She is doing much better.  Vomiting has resolved.  Pain has resolved.  Informed her about the results of her lab work as well as her mother in the room.  She has an elevation in the anion gap she has a drop in the CO2.  Similar to this morning.  I think the best course of action is to put her in the observation unit continue to hydrate her and continue to observe her.  She still having a little bit of nausea but again is much better.  We still not been able to obtain a stool sample.  Talk with patient and mother at length.  All questions answered [MM]   1713 AST (SGOT)(!): 39 [MM]   1713 ALT (SGPT)(!): 67  Elevation of liver function test is very  nonspecific. [MM]   1717 I did discuss the case with Aayush rosales who is the midlevel provider in the observation unit.  Informed him about the results of the test.  He agrees to admit.  Other thing I like to add the ALT and AST were normal in January previous liver function tests were normal.  There is no obvious gallstones seen on the CT scan of the abdomen pelvis.  Agrees to admit and will further evaluate. [MM]      ED Course User Index  [MM] Tavares Montero MD       AS OF 19:05 EDT VITALS:    BP - 120/77  HR - 77  TEMP - 98.8 °F (37.1 °C) (Oral)  02 SATS - 100%    SOCIAL DETERMINANTS OF HEALTH THAT IMPACT OR LIMIT CARE (For example..Homelessness,safe discharge, inability to obtain care, follow up, or prescriptions):      DIAGNOSIS  Final diagnoses:   Abdominal pain, vomiting, and diarrhea   Metabolic acidosis, increased anion gap   Hypokalemia   Abnormal liver function tests         DISPOSITION  Patient admitted to observation unit to monitor bed.          DICTATED UTILIZING DRAGON DICTATION    Note Disclaimer: At [facility], we believe that sharing information builds trust and better relationships. You are receiving this note because you recently visited [facility]. It is possible you will see health information before a provider has talked with you about it. This kind of information can be easy to misunderstand. To help you fully understand what it means for your health, we urge you to discuss this note with your provider.       Tavares Montero MD  05/04/25 7000

## 2025-05-04 NOTE — ED TRIAGE NOTES
Pt c/o n/v that started last week and became worse. Pt was seen at [facility] in [place] last night for same

## 2025-05-04 NOTE — FSED PROVIDER NOTE
Subjective   History of Present Illness  Patient is complaining of nausea and vomiting.  Patient admits to drinking alcohol today.  Patient said that she took weight loss injection recently, she said that this happened to her before, but not that bad, after she took weight loss medication injection.    On exam, actively vomiting, no abdominal tenderness, otherwise normal exam        Review of Systems   Constitutional: Negative.  Negative for activity change, appetite change, fatigue and fever.   HENT: Negative.  Negative for congestion, facial swelling, hearing loss, mouth sores, rhinorrhea and sinus pain.    Eyes: Negative.  Negative for photophobia, pain, discharge, redness and itching.   Respiratory: Negative.  Negative for cough, chest tightness, shortness of breath, wheezing and stridor.    Cardiovascular: Negative.  Negative for chest pain, palpitations and leg swelling.   Gastrointestinal:  Positive for nausea and vomiting. Negative for abdominal distention, abdominal pain, constipation and diarrhea.   Genitourinary: Negative.    Musculoskeletal: Negative.  Negative for back pain, joint swelling and neck pain.   Skin: Negative.  Negative for color change, pallor and rash.   Allergic/Immunologic: Negative.    Neurological: Negative.  Negative for dizziness, tremors, seizures, syncope, facial asymmetry, speech difficulty, weakness, light-headedness, numbness and headaches.   Hematological: Negative.    Psychiatric/Behavioral: Negative.  Negative for agitation, behavioral problems, hallucinations and suicidal ideas.        Past Medical History:   Diagnosis Date    Abnormal ECG     Arrhythmia    Anxiety     Arrhythmia     Clotting disorder     Think from hemorroids    Fissure, anal 03/2021    GERD (gastroesophageal reflux disease)     Hyperlipidemia     Hypothyroidism 08/2018    Irregular heart beats     followed by Dr. Cosme.. EKG/ECHO done 11/2022 on file.    Kidney stone 08/1998       No Known Allergies    Past  Surgical History:   Procedure Laterality Date     SECTION  2021    COLONOSCOPY  2024    Normal    D & C WITH SUCTION N/A 2024    Procedure: SUCTION DILATATION AND CURETTAGE WITH ULTRASOUND AVAILABLE;  Surgeon: Rubi Jara MD;  Location: [facility] OR;  Service: Obstetrics/Gynecology;  Laterality: N/A;    KIDNEY STONE SURGERY      LEE  2018       Family History   Problem Relation Age of Onset    Diabetes Sister         Type 1    Irritable bowel syndrome Mother     Heart attack Maternal Grandmother     Diabetes Sister         Type 1    Heart attack Maternal Aunt     Malig Hyperthermia Neg Hx        Social History     Socioeconomic History    Marital status:    Tobacco Use    Smoking status: Never    Smokeless tobacco: Never    Tobacco comments:     Caffeine - coffee on occas.    Vaping Use    Vaping status: Never Used   Substance and Sexual Activity    Alcohol use: Yes     Alcohol/week: 6.0 standard drinks of alcohol     Types: 4 Glasses of wine, 2 Drinks containing 0.5 oz of alcohol per week     Comment: 2 -3 days a week    Drug use: Never    Sexual activity: Yes     Partners: Male     Birth control/protection: Birth control pill           Objective   Physical Exam  Vitals and nursing note reviewed.   Constitutional:       Appearance: Normal appearance.   HENT:      Head: Normocephalic and atraumatic.      Right Ear: Tympanic membrane, ear canal and external ear normal.      Left Ear: Tympanic membrane, ear canal and external ear normal.      Nose: Nose normal.      Mouth/Throat:      Mouth: Mucous membranes are moist. Mucous membranes are dry.      Pharynx: Oropharynx is clear.   Eyes:      Extraocular Movements: Extraocular movements intact.      Conjunctiva/sclera: Conjunctivae normal.      Pupils: Pupils are equal, round, and reactive to light.   Cardiovascular:      Rate and Rhythm: Normal rate and regular rhythm.      Heart sounds: No murmur heard.     No friction rub. No  gallop.   Pulmonary:      Effort: Pulmonary effort is normal. No respiratory distress.      Breath sounds: Normal breath sounds. No stridor. No wheezing, rhonchi or rales.   Chest:      Chest wall: No tenderness.   Abdominal:      General: Abdomen is flat. Bowel sounds are normal.      Palpations: Abdomen is soft.   Musculoskeletal:         General: No swelling, tenderness, deformity or signs of injury. Normal range of motion.      Cervical back: Normal range of motion and neck supple. No rigidity or tenderness.   Lymphadenopathy:      Cervical: No cervical adenopathy.   Skin:     General: Skin is warm and dry.   Neurological:      General: No focal deficit present.      Mental Status: She is alert and oriented to person, place, and time. Mental status is at baseline.      Cranial Nerves: No cranial nerve deficit.      Sensory: No sensory deficit.      Motor: No weakness.      Coordination: Coordination normal.   Psychiatric:         Mood and Affect: Mood normal.         Behavior: Behavior normal.         Thought Content: Thought content normal.         Judgment: Judgment normal.         Procedures           ED Course                                           Medical Decision Making  Patient walked to the bathroom multiple times with no dizziness and no problems, vomiting stopped patient feels mildly nauseous but she feels fine to go home.    Problems Addressed:  Nausea and vomiting in adult patient: complicated acute illness or injury    Amount and/or Complexity of Data Reviewed  Labs: ordered.    Risk  Prescription drug management.        Final diagnoses:   Nausea and vomiting in adult patient       ED Disposition  ED Disposition       ED Disposition   Discharge    Condition   Stable    Comment   --               Roslyn Gandhi, APRN  [address] [facility]  [address] [place], [place] [id number] [phone]    In 3 days      [address]  [address] [phone]    If symptoms worsen         Medication List        New Prescriptions      metoclopramide 5 MG tablet  Commonly known as: REGLAN  Take 1 tablet by mouth 3 (Three) Times a Day As Needed (Nausea and vomiting) for up to 10 doses.               Where to Get Your Medications        These medications were sent to [id number] - [place], [place] - [address] AT [address] [place] - [phone] [facility] [phone]   [address] [place] [address]      Phone: [phone]   metoclopramide 5 MG tablet

## 2025-05-05 ENCOUNTER — TELEPHONE (OUTPATIENT)
Dept: INTERNAL MEDICINE | Facility: CLINIC | Age: 45
End: 2025-05-05
Payer: COMMERCIAL

## 2025-05-05 ENCOUNTER — READMISSION MANAGEMENT (OUTPATIENT)
Dept: CALL CENTER | Facility: HOSPITAL | Age: 45
End: 2025-05-05
Payer: COMMERCIAL

## 2025-05-05 VITALS
HEIGHT: 60 IN | BODY MASS INDEX: 34.16 KG/M2 | OXYGEN SATURATION: 98 % | HEART RATE: 79 BPM | WEIGHT: 174 LBS | RESPIRATION RATE: 18 BRPM | TEMPERATURE: 98.6 F | DIASTOLIC BLOOD PRESSURE: 68 MMHG | SYSTOLIC BLOOD PRESSURE: 109 MMHG

## 2025-05-05 LAB
ADV 40+41 DNA STL QL NAA+NON-PROBE: NOT DETECTED
ALBUMIN SERPL-MCNC: 3.8 G/DL (ref 3.5–5.2)
ALBUMIN/GLOB SERPL: 1.9 G/DL
ALP SERPL-CCNC: 50 U/L (ref 39–117)
ALT SERPL W P-5'-P-CCNC: 51 U/L (ref 1–33)
ANION GAP SERPL CALCULATED.3IONS-SCNC: 12 MMOL/L (ref 5–15)
AST SERPL-CCNC: 32 U/L (ref 1–32)
ASTRO TYP 1-8 RNA STL QL NAA+NON-PROBE: NOT DETECTED
BILIRUB SERPL-MCNC: 0.5 MG/DL (ref 0–1.2)
BUN SERPL-MCNC: 3 MG/DL (ref 6–20)
BUN/CREAT SERPL: 3.8 (ref 7–25)
C CAYETANENSIS DNA STL QL NAA+NON-PROBE: NOT DETECTED
C COLI+JEJ+UPSA DNA STL QL NAA+NON-PROBE: NOT DETECTED
C DIFF TOX GENS STL QL NAA+PROBE: NEGATIVE
CALCIUM SPEC-SCNC: 7.2 MG/DL (ref 8.6–10.5)
CHLORIDE SERPL-SCNC: 109 MMOL/L (ref 98–107)
CO2 SERPL-SCNC: 20 MMOL/L (ref 22–29)
CREAT SERPL-MCNC: 0.79 MG/DL (ref 0.57–1)
CRYPTOSP DNA STL QL NAA+NON-PROBE: NOT DETECTED
DEPRECATED RDW RBC AUTO: 42.9 FL (ref 37–54)
E HISTOLYT DNA STL QL NAA+NON-PROBE: NOT DETECTED
EAEC PAA PLAS AGGR+AATA ST NAA+NON-PRB: NOT DETECTED
EC STX1+STX2 GENES STL QL NAA+NON-PROBE: NOT DETECTED
EGFRCR SERPLBLD CKD-EPI 2021: 95.3 ML/MIN/1.73
EPEC EAE GENE STL QL NAA+NON-PROBE: NOT DETECTED
ERYTHROCYTE [DISTWIDTH] IN BLOOD BY AUTOMATED COUNT: 12.4 % (ref 12.3–15.4)
ETEC LTA+ST1A+ST1B TOX ST NAA+NON-PROBE: NOT DETECTED
G LAMBLIA DNA STL QL NAA+NON-PROBE: NOT DETECTED
GLOBULIN UR ELPH-MCNC: 2 GM/DL
GLUCOSE SERPL-MCNC: 100 MG/DL (ref 65–99)
HCT VFR BLD AUTO: 33.5 % (ref 34–46.6)
HGB BLD-MCNC: 11 G/DL (ref 12–15.9)
LIPASE SERPL-CCNC: 28 U/L (ref 13–60)
MCH RBC QN AUTO: 31.5 PG (ref 26.6–33)
MCHC RBC AUTO-ENTMCNC: 32.8 G/DL (ref 31.5–35.7)
MCV RBC AUTO: 96 FL (ref 79–97)
NOROVIRUS GI+II RNA STL QL NAA+NON-PROBE: NOT DETECTED
P SHIGELLOIDES DNA STL QL NAA+NON-PROBE: NOT DETECTED
PLATELET # BLD AUTO: 236 10*3/MM3 (ref 140–450)
PMV BLD AUTO: 10.2 FL (ref 6–12)
POTASSIUM SERPL-SCNC: 3.4 MMOL/L (ref 3.5–5.2)
PROT SERPL-MCNC: 5.8 G/DL (ref 6–8.5)
RBC # BLD AUTO: 3.49 10*6/MM3 (ref 3.77–5.28)
RVA RNA STL QL NAA+NON-PROBE: NOT DETECTED
S ENT+BONG DNA STL QL NAA+NON-PROBE: NOT DETECTED
SAPO I+II+IV+V RNA STL QL NAA+NON-PROBE: NOT DETECTED
SHIGELLA SP+EIEC IPAH ST NAA+NON-PROBE: NOT DETECTED
SODIUM SERPL-SCNC: 141 MMOL/L (ref 136–145)
TSH SERPL DL<=0.05 MIU/L-ACNC: 1.91 UIU/ML (ref 0.27–4.2)
V CHOL+PARA+VUL DNA STL QL NAA+NON-PROBE: NOT DETECTED
V CHOLERAE DNA STL QL NAA+NON-PROBE: NOT DETECTED
WBC NRBC COR # BLD AUTO: 6.5 10*3/MM3 (ref 3.4–10.8)
Y ENTEROCOL DNA STL QL NAA+NON-PROBE: NOT DETECTED

## 2025-05-05 PROCEDURE — G0378 HOSPITAL OBSERVATION PER HR: HCPCS

## 2025-05-05 PROCEDURE — 99213 OFFICE O/P EST LOW 20 MIN: CPT | Performed by: PHYSICIAN ASSISTANT

## 2025-05-05 PROCEDURE — 80050 GENERAL HEALTH PANEL: CPT

## 2025-05-05 PROCEDURE — 87493 C DIFF AMPLIFIED PROBE: CPT | Performed by: PHYSICIAN ASSISTANT

## 2025-05-05 PROCEDURE — 87507 IADNA-DNA/RNA PROBE TQ 12-25: CPT

## 2025-05-05 PROCEDURE — 83690 ASSAY OF LIPASE: CPT

## 2025-05-05 PROCEDURE — 96361 HYDRATE IV INFUSION ADD-ON: CPT

## 2025-05-05 RX ORDER — ONDANSETRON 4 MG/1
4 TABLET, ORALLY DISINTEGRATING ORAL EVERY 6 HOURS PRN
Qty: 30 TABLET | Refills: 0 | Status: SHIPPED | OUTPATIENT
Start: 2025-05-05 | End: 2025-05-05

## 2025-05-05 RX ORDER — ONDANSETRON 4 MG/1
4 TABLET, ORALLY DISINTEGRATING ORAL EVERY 8 HOURS PRN
Qty: 30 TABLET | Refills: 0 | Status: SHIPPED | OUTPATIENT
Start: 2025-05-05

## 2025-05-05 RX ADMIN — SODIUM CHLORIDE 100 ML/HR: 9 INJECTION, SOLUTION INTRAVENOUS at 06:07

## 2025-05-05 RX ADMIN — PANTOPRAZOLE SODIUM 40 MG: 40 TABLET, DELAYED RELEASE ORAL at 10:45

## 2025-05-05 RX ADMIN — LEVOTHYROXINE SODIUM 50 MCG: 50 TABLET ORAL at 09:56

## 2025-05-05 RX ADMIN — METOPROLOL SUCCINATE 25 MG: 25 TABLET, EXTENDED RELEASE ORAL at 10:45

## 2025-05-05 NOTE — PROGRESS NOTES
THEODORE RANDALL ATTESTATION NOTE    The ADEOLA and I have discussed this patient's history, physical exam, and treatment plan.  I have reviewed the documentation and personally had a face to face interaction with the patient. I affirm the documentation and agree with the treatment and plan.  The attached note describes my personal findings.      I provided a substantive portion of the care of this patient. I personally performed the physical exam, in its entirety. I personally spent 22 minutes on the care of this patient today.    Brianne Mendoza is a 44 y.o. female who presented to the emergency department yesterday complaining of nausea vomiting and diarrhea.  The patient required admission to the observation unit for further evaluation and management.  This morning she feels back to normal.  She has had no further diarrhea.  She has no nausea or vomiting.  She has no abdominal pain.  CT shows colonic diverticulosis.  Respiratory viral panel is negative.  Given that she feels entirely back to normal, plan to advance her diet.  GI has been consulted although I suspect they will advise no further workup given that her if she is back to normal.  If she is able to tolerate her diet then I expect her to be able to be discharged home today.          On exam:  GENERAL: Awake, alert, no acute distress  SKIN: Warm, dry  HENT: Normocephalic, atraumatic  EYES: no scleral icterus  CV: regular rhythm, regular rate  RESPIRATORY: normal effort, lungs clear  ABDOMEN: soft, nontender, nondistended  MUSCULOSKELETAL: no deformity  NEURO: alert, moves all extremities, follows commands          Note Disclaimer: At [facility], we believe that sharing information builds trust and better relationships. You are receiving this note because you recently visited [facility]. It is possible you will see health information before a provider has talked with you about it. This kind of information can be easy to misunderstand. To help you fully  understand what it means for your health, we urge you to discuss this note with your provider.

## 2025-05-05 NOTE — CONSULTS
[facility]  Initial Inpatient Consult Note    Referring Provider: MARCOS Miguel     Reason for Consultation: GI, vomiting, diarrhea    Subjective     History of present illness:    44 y.o. female with past medical history of hyperlipidemia, hypothyroidism, anxiety, GERD patient who we are asked to see for evaluation of nausea, vomiting, diarrhea.     Patient reports starting last Wednesday she began developing nausea, vomiting, diarrhea.  She was doing better throughout the rest of the week until she began developing persistent vomiting on Saturday.  She was unable to tolerate much p.o. intake prompting her to come back to the hospital.  Patient reports her symptoms are improved today.  No nausea, vomiting, or diarrhea this morning.  She does not currently have any abdominal pain.  She reports that she has had some issues with diarrhea intermittently in the past and underwent colonoscopy with Dr. Ulrich in 2024 which was unremarkable.  She is on semaglutide therapy for weight loss.  She did reduce the dose.  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    CT abdomen pelvis with diverticulosis, nonobstructive right nephrolithiasis, otherwise normal.    Stool studies negative.    No leukocytosis or fevers.    Past Medical History:  Past Medical History:   Diagnosis Date    Abnormal ECG     Arrhythmia    Anxiety     Arrhythmia     Clotting disorder     Think from hemorroids    Fissure, anal 2021    GERD (gastroesophageal reflux disease)     Hyperlipidemia     Hypothyroidism 2018    Irregular heart beats     followed by Dr. Cosme.. EKG/ECHO done 2022 on file.    Kidney stone 1998     Past Surgical History:  Past Surgical History:   Procedure Laterality Date     SECTION  2021    COLONOSCOPY  2024    Normal    D & C WITH SUCTION N/A 2024    Procedure: SUCTION DILATATION AND CURETTAGE WITH ULTRASOUND AVAILABLE;  Surgeon: Rubi Jara MD;  Location:  [facility];  Service: Obstetrics/Gynecology;  Laterality: N/A;    KIDNEY STONE SURGERY      [facility]  2018      Social History:   Social History     Tobacco Use    Smoking status: Never    Smokeless tobacco: Never    Tobacco comments:     Caffeine - coffee on occas.    Substance Use Topics    Alcohol use: Yes     Alcohol/week: 6.0 standard drinks of alcohol     Types: 4 Glasses of wine, 2 Drinks containing 0.5 oz of alcohol per week     Comment: 2 -3 days a week      Family History:  Family History   Problem Relation Age of Onset    Diabetes Sister         Type 1    Irritable bowel syndrome Mother     Heart attack Maternal Grandmother     Diabetes Sister         Type 1    Heart attack Maternal Aunt     Malig Hyperthermia Neg Hx        Home Meds:  Medications Prior to Admission   Medication Sig Dispense Refill Last Dose/Taking    Ada 24 Fe 1-20 MG-MCG(24) per tablet Take 1 tablet by mouth Daily.   Past Week    hydrOXYzine (ATARAX) 25 MG tablet TAKE 1 TABLET BY MOUTH EVERY 8 HOURS AS NEEDED FOR ITCHING OR ANXIETY 60 tablet 0 Past Week    levothyroxine (SYNTHROID, LEVOTHROID) 50 MCG tablet Take 1 tablet by mouth Daily. 90 tablet 1 5/3/2025 Morning    metoprolol succinate XL (TOPROL-XL) 25 MG 24 hr tablet TAKE 1 TABLET BY MOUTH DAILY 90 tablet 3 5/3/2025 Morning    pantoprazole (PROTONIX) 40 MG EC tablet Take 1 tablet by mouth Daily. 90 tablet 3 5/4/2025 Morning    Prenatal Vit-Fe Fumarate-FA (PRENATAL VITAMIN PO)    5/3/2025 Morning    SEMAGLUTIDE, 1 MG/DOSE, SC    5/3/2025 Morning    valACYclovir (VALTREX) 500 MG tablet Take 1 tablet by mouth As Needed.   5/3/2025 Morning    metoclopramide (REGLAN) 5 MG tablet Take 1 tablet by mouth 3 (Three) Times a Day As Needed (Nausea and vomiting) for up to 10 doses. 10 tablet 0 Unknown    tretinoin (RETIN-A) 0.025 % cream Apply 1 Application topically to the appropriate area as directed.   Unknown     Current Meds:   levothyroxine, 50 mcg, Oral, Daily  metoprolol succinate  XL, 25 mg, Oral, Daily  pantoprazole, 40 mg, Oral, Daily      Allergies:  No Known Allergies    Objective     Vital Signs  Temp:  [97 °F (36.1 °C)-98.8 °F (37.1 °C)] 98.6 °F (37 °C)  Heart Rate:  [] 87  Resp:  [16-18] 18  BP: ()/(63-92) 104/71  Physical Exam:  General Appearance:     Alert, cooperative, in no acute distress   Abdomen:     Normal bowel sounds, no masses, no organomegaly, soft     nontender, nondistended, no guarding, no rebound                 tenderness   Rectal:     Deferred       Results Review:   I reviewed the patient's new clinical results.  I reviewed the patient's new imaging results and agree with the interpretation.    Results from last 7 days   Lab Units 05/05/25 0052 05/04/25  1150 05/04/25  0120   WBC 10*3/mm3 6.50 6.89 9.87   HEMOGLOBIN g/dL 11.0* 13.3 13.2   HEMATOCRIT % 33.5* 39.6 38.9   PLATELETS 10*3/mm3 236 302 303     Results from last 7 days   Lab Units 05/05/25  0052 05/04/25  1150 05/04/25  0120   SODIUM mmol/L 141 135* 138   POTASSIUM mmol/L 3.4* 3.4* 3.7   CHLORIDE mmol/L 109* 100 102   CO2 mmol/L 20.0* 18.6* 18.3*   BUN mg/dL 3* 4* 5*   CREATININE mg/dL 0.79 0.82 0.84   CALCIUM mg/dL 7.2* 8.2* 9.2   BILIRUBIN mg/dL 0.5 0.9 0.9   ALK PHOS U/L 50 65 62   ALT (SGPT) U/L 51* 67* 77*   AST (SGOT) U/L 32 39* 54*   GLUCOSE mg/dL 100* 131* 159*         Lab Results   Lab Value Date/Time    LIPASE 28 05/05/2025 0052    LIPASE 22 05/04/2025 1150    LIPASE 25 05/04/2025 0120       Radiology:  CT Abdomen Pelvis With Contrast   Final Result           1. Colonic diverticulosis. No acute inflammatory process of bowel is   identified. Follow-up as indications persist.       2. Nonobstructive right nephrolithiasis. No hydronephrosis.       This report was finalized on 5/4/2025 2:13 PM by Dr. Roly Rogers M.D on Workstation: [id number]              Assessment & Plan   Assessment:   Nausea and vomiting  Diarrhea    Plan:   Patient with nausea and vomiting that has  essentially resolved.  She is tolerating a clear liquid diet and is interested in trying solid foods.  Recommend continuation of supportive care with as needed antiemetics.  Suspect likely etiology of her symptoms is semaglutide therapy with possible exposure to viral etiology with her job although her GI PCR was negative.  No acute indication for EGD or colonoscopy.  She is clinically improved at this point.  Okay for discharge home from a GI standpoint if she tolerates a low residue diet.  She can follow-up as needed with Dr. Ulrich or [facility] Khadra Campos.    I discussed the patients findings and my recommendations with patient.    Dictated utilizing Dragon dictation.         Sujata Mckee PA-C   [facility]  [address] [place] [address]  Office: [phone]

## 2025-05-05 NOTE — PLAN OF CARE
Goal Outcome Evaluation:            No complaints of nausea/vomiting throughout the night. No BM. NPO since midnight, GI consult in the morning.

## 2025-05-05 NOTE — PLAN OF CARE
Goal Outcome Evaluation:  Plan of Care Reviewed With: patient        Progress: improving  Outcome Evaluation: pt stable for discharge home. AVS given, folllow up instructed. Transport by family.

## 2025-05-05 NOTE — DISCHARGE SUMMARY
ED OBSERVATION PROGRESS/DISCHARGE SUMMARY    Date of Admission: 5/4/2025   LOS: 0 days   PCP: Roslyn Gandhi APRN    Final Diagnosis intractable nausea vomiting      Subjective     Hospital Outcome: Brianne Mendoza is a 44 y.o. female with medical history significant for hyperlipidemia, hypothyroidism, presents with N/V/D since Wednesday evening.  She was initially seen at the [facility] ED at around 1 AM and had an unremarkable CBC.  CMP show anion gap was 17.7, glucose 159, AST 54, ALT 77.  hCG and lipase were negative.  She has not picked up her prescribed Reglan from previous ED visit.  She returned to the ED around noon for persistent nausea and vomiting, she has not had a bowel movement since early morning today.  She denies sick contacts, denies fever, sore throat, SOA, cough.  Denies abdominal pain.  On exam Lu sign and psoas sign negative.  Abdomen is nondistended, nontender.  She denies bloody or bilious emesis/stool.     5/5/2025: Patient reports that she feels much better than on admission.  GI PCR and C. difficile testing negative.  GI saw and evaluated the patient and suspect etiology of her symptoms is semaglutide therapy with possible exposure to viral etiology, okay for discharge from GI standpoint and keep outpatient follow-up as previously scheduled with GI.  Patient has been advancing diet throughout the day and is eager for discharge home.  All labs and imaging findings discussed with patient as well as specialist recommendations and patient is agreeable for discharge home at this time.    ROS:  General: no fevers, chills  Respiratory: no cough, dyspnea  Cardiovascular: no chest pain, palpitations  Abdomen: No abdominal pain, nausea, vomiting, or diarrhea  Neurologic: No focal weakness    Objective   Physical Exam:  I have reviewed the vital signs.  Temp:  [97 °F (36.1 °C)-98.8 °F (37.1 °C)] 98.8 °F (37.1 °C)  Heart Rate:  [] 86  Resp:  [16-18] 16  BP: ()/(63-92)  97/69  General Appearance:    Alert, cooperative, no distress  Head:    Normocephalic, atraumatic, normal hearing  Eyes:    Sclerae anicteric, EOMI  Neck:   Supple, nontender  Lungs: Clear to auscultation bilaterally, respirations unlabored on room air  Heart: Regular rate and rhythm, S1 and S2 normal, no murmur  Abdomen:  Soft, mildly tender without guarding or rebound, bowel sounds active, nondistended, obese abdomen  Extremities: No clubbing, cyanosis, or edema to lower extremities  Pulses:  2+ and symmetric in distal lower extremities  Skin: No rashes   Neurologic: Oriented x3, Normal strength to extremities    Results Review:    I have reviewed the labs, radiology results and diagnostic studies.    Results from last 7 days   Lab Units 05/05/25  0052   WBC 10*3/mm3 6.50   HEMOGLOBIN g/dL 11.0*   HEMATOCRIT % 33.5*   PLATELETS 10*3/mm3 236     Results from last 7 days   Lab Units 05/05/25  0052 05/04/25  1150 05/04/25  0120   SODIUM mmol/L 141 135* 138   POTASSIUM mmol/L 3.4* 3.4* 3.7   CHLORIDE mmol/L 109* 100 102   CO2 mmol/L 20.0* 18.6* 18.3*   BUN mg/dL 3* 4* 5*   CREATININE mg/dL 0.79 0.82 0.84   CALCIUM mg/dL 7.2* 8.2* 9.2   BILIRUBIN mg/dL 0.5 0.9 0.9   ALK PHOS U/L 50 65 62   ALT (SGPT) U/L 51* 67* 77*   AST (SGOT) U/L 32 39* 54*   GLUCOSE mg/dL 100* 131* 159*     Imaging Results (Last 24 Hours)       Procedure Component Value Units Date/Time    CT Abdomen Pelvis With Contrast [[id number]] Collected: 05/04/25 1409     Updated: 05/04/25 1417    Narrative:      CT ABDOMEN PELVIS W CONTRAST-     INDICATIONS: Nausea and vomiting     TECHNIQUE: Radiation dose reduction techniques were utilized, including  automated exposure control and exposure modulation based on body size.  Enhanced ABDOMEN AND PELVIS CT     COMPARISON: None available     FINDINGS:     Nonobstructive right nephrolithiasis is noted, as large as 5 mm.     Otherwise unremarkable appearance of the liver, gallbladder, spleen,  adrenal glands,  pancreas, kidneys, bladder.     No bowel obstruction. Assessment of colon for wall thickening is limited  by lack of distention. Colonic diverticula are seen that do not appear  inflamed. Appendix does not appear inflamed.     No free intraperitoneal gas or free fluid.     Scattered small mesenteric and para-aortic lymph nodes are seen that are  not significant by size criteria.     Abdominal aorta is not aneurysmal.      The lung bases are clear.     Degenerative changes are seen in the spine. No acute fracture is  identified.             Impression:            1. Colonic diverticulosis. No acute inflammatory process of bowel is  identified. Follow-up as indications persist.     2. Nonobstructive right nephrolithiasis. No hydronephrosis.     This report was finalized on 5/4/2025 2:13 PM by Dr. Roly Rogers M.D on Workstation: [id number]               I have reviewed the medications.     Discharge Medications        ASK your doctor about these medications        Instructions Start Date   Ada 24 Fe 1-20 MG-MCG(24) per tablet  Generic drug: norethindrone-ethinyl estradiol-ferrous fumarate   1 tablet, Daily      hydrOXYzine 25 MG tablet  Commonly known as: ATARAX   TAKE 1 TABLET BY MOUTH EVERY 8 HOURS AS NEEDED FOR ITCHING OR ANXIETY      levothyroxine 50 MCG tablet  Commonly known as: SYNTHROID, LEVOTHROID   50 mcg, Oral, Daily      metoclopramide 5 MG tablet  Commonly known as: REGLAN   5 mg, Oral, 3 Times Daily PRN      metoprolol succinate XL 25 MG 24 hr tablet  Commonly known as: TOPROL-XL   25 mg, Oral, Daily      pantoprazole 40 MG EC tablet  Commonly known as: PROTONIX   40 mg, Oral, Daily      PRENATAL VITAMIN PO       SEMAGLUTIDE (1 MG/DOSE) SC       tretinoin 0.025 % cream  Commonly known as: RETIN-A   1 Application      valACYclovir 500 MG tablet  Commonly known as: VALTREX   500 mg, As Needed               ---------------------------------------------------------------------------------------------  Assessment & Plan   Assessment/Problem List    Intractable nausea and vomiting      Plan:    Persistent N/V/D  - CT A/P negative for acute findings  - Troponin flat  - UA bland  - RVP, GI PCR, C. difficile all negative  - GI saw and evaluated the patient and if tolerating food okay for discharge from their standpoint no further inpatient testing or treatment recommended at this time.  Patient can follow-up with Dr. Brylee or Khadra Manzano as needed or as previously scheduled.  - Patient has been tolerating p.o. intake and gradually advancing diet with good tolerance.    Disposition: Discharge to home    Follow-up after Discharge: PCP in 1 to 2 weeks, GI as previously scheduled    This note will serve as a discharge summary    Tina Sarkar PA-C 05/05/25 07:15 EDT    I have worn appropriate PPE during this patient encounter, sanitized my hands both with entering and exiting patient's room.      37 minutes has been spent by [facility] providers in the care of this patient while under observation status on this date 05/05/25

## 2025-05-05 NOTE — OUTREACH NOTE
Prep Survey      Flowsheet Row Responses   [facility] patient discharged from? [place]   Is LACE score < 7 ? Yes   Eligibility [facility]   Date of Admission 05/04/25   Date of Discharge 05/05/25   Discharge diagnosis Intractable nausea and vomiting   Does the patient have one of the following disease processes/diagnoses(primary or secondary)? Other   Prep survey completed? Yes            Karin WHITE - Registered Nurse

## 2025-05-06 ENCOUNTER — TRANSITIONAL CARE MANAGEMENT TELEPHONE ENCOUNTER (OUTPATIENT)
Dept: CALL CENTER | Facility: HOSPITAL | Age: 45
End: 2025-05-06
Payer: COMMERCIAL

## 2025-05-06 NOTE — OUTREACH NOTE
Call Center TCM Note      Flowsheet Row Responses   [facility] patient discharged from? [place]   Does the patient have one of the following disease processes/diagnoses(primary or secondary)? Other   TCM attempt successful? Yes   Call start time 1058   Call end time 1101   Discharge diagnosis Intractable nausea and vomiting   Person spoke with today (if not patient) and relationship Patient   Meds reviewed with patient/caregiver? Yes   Is the patient having any side effects they believe may be caused by any medication additions or changes? No   Does the patient have all medications ordered at discharge? Yes   Is the patient taking all medications as directed (includes completed medication regime)? Yes   Comments Patient has a previously scheduled followup on 5/15 with PCP Roslyn Gandhi   Does the patient have an appointment with their PCP within 7-14 days of discharge? Yes   Psychosocial issues? No   Did the patient receive a copy of their discharge instructions? Yes   Nursing interventions Reviewed instructions with patient   What is the patient's perception of their health status since discharge? Improving   Is the patient/caregiver able to teach back signs and symptoms related to disease process for when to call PCP? Yes   Is the patient/caregiver able to teach back signs and symptoms related to disease process for when to call 911? Yes   Is the patient/caregiver able to teach back the hierarchy of who to call/visit for symptoms/problems? PCP, Specialist, Home health nurse, Urgent Care, ED, 911 Yes   If the patient is a current smoker, are they able to teach back resources for cessation? Not a smoker   TCM call completed? Yes   Wrap up additional comments Patient reports she is doing ok. She has her medication and followup in place. No questions at this time.   Call end time 1101   Would this patient benefit from a Referral to [facility] Social Work? No   Is the patient interested in additional calls from an  ambulatory ? No            Alexandru HWANG - Registered Nurse    5/6/2025, 11:01 EDT

## 2025-05-06 NOTE — CASE MANAGEMENT/SOCIAL WORK
Case Management Discharge Note    Final Note: Pt DC home prior to CCP completing screen. ZULEMA Baum/CCP       Selected Continued Care - Discharged on 5/5/2025 Admission date: 5/4/2025 - Discharge disposition: Home or Self Care     Transportation Services  Private: Car    Final Discharge Disposition Code: 01 - home or self-care

## 2025-05-07 ENCOUNTER — TELEPHONE (OUTPATIENT)
Dept: INTERNAL MEDICINE | Facility: CLINIC | Age: 45
End: 2025-05-07
Payer: COMMERCIAL

## 2025-06-25 ENCOUNTER — OFFICE VISIT (OUTPATIENT)
Dept: CARDIOLOGY | Age: 45
End: 2025-06-25
Payer: COMMERCIAL

## 2025-06-25 VITALS
SYSTOLIC BLOOD PRESSURE: 110 MMHG | HEART RATE: 87 BPM | OXYGEN SATURATION: 99 % | DIASTOLIC BLOOD PRESSURE: 80 MMHG | HEIGHT: 60 IN | BODY MASS INDEX: 33.92 KG/M2 | WEIGHT: 172.8 LBS

## 2025-06-25 DIAGNOSIS — R00.2 PALPITATIONS: ICD-10-CM

## 2025-06-25 DIAGNOSIS — I49.3 PVC (PREMATURE VENTRICULAR CONTRACTION): Primary | ICD-10-CM

## 2025-06-25 PROCEDURE — 93000 ELECTROCARDIOGRAM COMPLETE: CPT | Performed by: INTERNAL MEDICINE

## 2025-06-25 PROCEDURE — 99214 OFFICE O/P EST MOD 30 MIN: CPT | Performed by: INTERNAL MEDICINE

## 2025-06-25 RX ORDER — METOPROLOL SUCCINATE 25 MG/1
25 TABLET, EXTENDED RELEASE ORAL DAILY
Qty: 90 TABLET | Refills: 3 | Status: SHIPPED | OUTPATIENT
Start: 2025-06-25

## 2025-06-25 NOTE — PROGRESS NOTES
"      CARDIOLOGY    Sia Cosme MD    ENCOUNTER DATE:  06/25/2025    Brianne Mendoza / 44 y.o. / female        CHIEF COMPLAINT / REASON FOR OFFICE VISIT     Follow-up      HISTORY OF PRESENT ILLNESS       HPI    Brianne Mendoza is a 44 y.o. female     This is a nice lady who is being followed in the [facility] in [place]. She was diagnosed with PVCs. She had a burden of up to 6.2% based on one tracing. She was having symptoms of palpitations and getting dizzy and lightheaded. She was placed on diltiazem 120 mg a day and did well on it. She became pregnant and it was recommended that she come off the diltiazem and switch to metoprolol. It sounds like she was evaluated for an ablation, but the electrophysiologist said the palpitations could actually get better during pregnancy and they did.     She comes in today for follow-up.  She has been taking the metoprolol.  She has been feeling good.  She has some occasional PVCs she notices mostly in the evening.    VITAL SIGNS     Visit Vitals  /80   Pulse 87   Ht 152.4 cm (60\")   Wt 78.4 kg (172 lb 12.8 oz)   SpO2 99%   BMI 33.75 kg/m²         Wt Readings from Last 3 Encounters:   06/25/25 78.4 kg (172 lb 12.8 oz)   05/04/25 78.9 kg (174 lb)   05/04/25 76.7 kg (169 lb)     Body mass index is 33.75 kg/m².      PHYSICAL EXAMINATION     Constitutional:       General: Not in acute distress.  Neck:      Vascular: No carotid bruit or JVD.   Pulmonary:      Effort: Pulmonary effort is normal.      Breath sounds: Normal breath sounds.   Cardiovascular:      Normal rate. Regular rhythm.      Murmurs: There is no murmur.   Psychiatric:         Mood and Affect: Mood and affect normal.           REVIEWED DATA       ECG 12 Lead    Date/Time: 6/25/2025 2:52 PM  Performed by: Sia Cosme MD    Authorized by: Sia Cosme MD  Comparison: compared with previous ECG from 3/8/2024  Similar to previous ECG  Rhythm: sinus rhythm  BPM: 87  Conduction: conduction " normal  ST Segments: ST segments normal  T Waves: T waves normal    Clinical impression: normal ECG            Lipid Panel          1/22/2025    11:52   Lipid Panel   Total Cholesterol 222    Triglycerides 194    HDL Cholesterol 47    VLDL Cholesterol 35    LDL Cholesterol  140        Lab Results   Component Value Date    GLUCOSE 100 (H) 05/05/2025    BUN 3 (L) 05/05/2025    CREATININE 0.79 05/05/2025     05/05/2025    K 3.4 (L) 05/05/2025     (H) 05/05/2025    CALCIUM 7.2 (L) 05/05/2025    PROTEINTOT 5.8 (L) 05/05/2025    ALBUMIN 3.8 05/05/2025    ALT 51 (H) 05/05/2025    AST 32 05/05/2025    ALKPHOS 50 05/05/2025    BILITOT 0.5 05/05/2025    GLOB 2.0 05/05/2025    AGRATIO 1.9 05/05/2025    BCR 3.8 (L) 05/05/2025    ANIONGAP 12.0 05/05/2025    EGFR 95.3 05/05/2025       ASSESSMENT & PLAN      Diagnosis Plan   1. PVC (premature ventricular contraction)        2. Palpitations            1.  Occasional PVCs which are symptomatic.  She was previously on Diltiazem and then on metoprolol while she was pregnant.  She has palpitations that she notices at night but overall they are controlled with the metoprolol.  Continue.    2.  Hyperlipidemia.  I reviewed her lipid panel from January 2025.  Increase exercise and cut carbohydrates.     3.  Hypothyroid.  Followed by her primary care provider     4.  Anxiety.     Follow-up with Alessandra in 1 year.    Orders Placed This Encounter   Procedures    ECG 12 Lead     This order was created via procedure documentation     Release to patient:   Routine Release [[id number]]           MEDICATIONS         Discharge Medications            Accurate as of June 25, 2025  2:53 PM. If you have any questions, ask your nurse or doctor.                Continue These Medications        Instructions Start Date   Hailey 24 Fe 1-20 MG-MCG(24) per tablet  Generic drug: norethindrone-ethinyl estradiol-ferrous fumarate   1 tablet, Daily      levothyroxine 50 MCG tablet  Commonly known as:  SYNTHROID, LEVOTHROID   50 mcg, Oral, Daily      metoclopramide 5 MG tablet  Commonly known as: REGLAN   5 mg, Oral, 3 Times Daily PRN      metoprolol succinate XL 25 MG 24 hr tablet  Commonly known as: TOPROL-XL   25 mg, Oral, Daily      ondansetron ODT 4 MG disintegrating tablet  Commonly known as: ZOFRAN-ODT   4 mg, Translingual, Every 8 Hours PRN      pantoprazole 40 MG EC tablet  Commonly known as: PROTONIX   40 mg, Oral, Daily      PRENATAL VITAMIN PO       SEMAGLUTIDE (1 MG/DOSE) SC       tretinoin 0.025 % cream  Commonly known as: RETIN-A   1 Application      valACYclovir 500 MG tablet  Commonly known as: VALTREX   500 mg, As Needed                 Sia Cosme MD  06/25/25  14:53 EDT    Part of this note may be an electronic transcription/translation of spoken language to printed text using the Dragon dictation system.

## 2025-06-26 ENCOUNTER — OFFICE VISIT (OUTPATIENT)
Dept: INTERNAL MEDICINE | Facility: CLINIC | Age: 45
End: 2025-06-26
Payer: COMMERCIAL

## 2025-06-26 VITALS
BODY MASS INDEX: 34.32 KG/M2 | WEIGHT: 174.8 LBS | DIASTOLIC BLOOD PRESSURE: 82 MMHG | TEMPERATURE: 97.8 F | HEART RATE: 82 BPM | HEIGHT: 60 IN | SYSTOLIC BLOOD PRESSURE: 120 MMHG | OXYGEN SATURATION: 99 %

## 2025-06-26 DIAGNOSIS — F41.9 ANXIETY: ICD-10-CM

## 2025-06-26 DIAGNOSIS — Z00.00 HEALTHCARE MAINTENANCE: ICD-10-CM

## 2025-06-26 DIAGNOSIS — E78.5 HYPERLIPIDEMIA, UNSPECIFIED HYPERLIPIDEMIA TYPE: Primary | ICD-10-CM

## 2025-06-26 DIAGNOSIS — Z09 HOSPITAL DISCHARGE FOLLOW-UP: ICD-10-CM

## 2025-06-26 DIAGNOSIS — E03.9 HYPOTHYROIDISM, UNSPECIFIED TYPE: ICD-10-CM

## 2025-06-26 NOTE — PROGRESS NOTES
"Chief Complaint  Hyperlipidemia    Hospital Follow Up    Subjective        Brianne Mendoza presents to [facility]  Hyperlipidemia      History of Present Illness      The patient presents for a follow-up visit.    She was last seen in January 2025, with a telemedicine consultation on 01/22/2025. She has been unable to attend several appointments due to her school schedule. She is currently fasting and is planning a family cruise vacation. She is seeking a refill of her hydroxyzine prescription, which she uses as needed and finds effective.    In May 2025, she experienced severe vomiting and diarrhea, necessitating hospitalization. Initial treatment at [facility] in [place] was ineffective, leading to her discharge. However, her symptoms persisted, prompting her to seek further medical attention at [facility] in [place], [place], where she was admitted. The treatment administered there alleviated her symptoms, although she continued to experience diarrhea for some time. She also reported a loss of appetite for three days. The attending physician suspected a gallbladder issue and recommended a follow-up with a gastroenterologist, which is scheduled for August 2025. She was advised to undergo blood work, including liver function tests, due to significant changes observed over a short period.    She has a history of kidney stones, having undergone lithotripsy twice at the ages of 19 and 23. Approximately a year ago, she consulted a urologist who identified three stones on one side and two on the other. She was able to pass one small stone.    PAST SURGICAL HISTORY:  Lithotripsy at age 19 and 23.       Objective   Vital Signs:  /82 (BP Location: Left arm, Patient Position: Sitting)   Pulse 82   Temp 97.8 °F (36.6 °C) (Oral)   Ht 152.4 cm (60\")   Wt 79.3 kg (174 lb 12.8 oz)   SpO2 99%   BMI 34.14 kg/m²   Estimated body mass index is 34.14 kg/m² as calculated " "from the following:    Height as of this encounter: 152.4 cm (60\").    Weight as of this encounter: 79.3 kg (174 lb 12.8 oz).         Physical Exam  Vitals and nursing note reviewed.   Constitutional:       Appearance: Normal appearance.   HENT:      Head: Normocephalic.      Right Ear: Tympanic membrane, ear canal and external ear normal.      Left Ear: Tympanic membrane, ear canal and external ear normal.      Nose: Nose normal.      Mouth/Throat:      Mouth: Mucous membranes are moist.   Eyes:      Pupils: Pupils are equal, round, and reactive to light.   Cardiovascular:      Rate and Rhythm: Normal rate and regular rhythm.      Pulses: Normal pulses.      Heart sounds: Normal heart sounds.      Comments: No peripheral edema noted  Pulmonary:      Effort: Pulmonary effort is normal. No respiratory distress.      Breath sounds: Normal breath sounds. No stridor. No wheezing, rhonchi or rales.      Comments: Denies SOB  Chest:      Chest wall: No tenderness.   Musculoskeletal:         General: Normal range of motion.   Skin:     General: Skin is warm.      Capillary Refill: Capillary refill takes less than 2 seconds.   Neurological:      Mental Status: She is alert and oriented to person, place, and time.   Psychiatric:         Behavior: Behavior normal.        Physical Exam  Cardiovascular: Regular rate and rhythm, no murmurs, rubs, or gallops  Gastrointestinal: Soft, no tenderness, no distention, no masses     Result Review :      Common labs          1/22/2025    11:52 5/4/2025    01:20 5/4/2025    11:50 5/5/2025    00:52   Common Labs   Glucose 68  159  131  100    BUN 12  5  4  3    Creatinine 0.79  0.84  0.82  0.79    Sodium 139  138  135  141    Potassium 4.4  3.7  3.4  3.4    Chloride 101  102  100  109    Calcium 9.3  9.2  8.2  7.2    Albumin 4.6  4.6  4.4  3.8    Total Bilirubin 0.4  0.9  0.9  0.5    Alkaline Phosphatase 61  62  65  50    AST (SGOT) 24  54  39  32    ALT (SGPT) 25  77  67  51    WBC 6.0  " 9.87  6.89  6.50    Hemoglobin 13.2  13.2  13.3  11.0    Hematocrit 39.4  38.9  39.6  33.5    Platelets 304  303  302  236    Total Cholesterol 222       Triglycerides 194       HDL Cholesterol 47       LDL Cholesterol  140       Hemoglobin A1C 5.3           Results  Labs   - AST and ALT: Slightly elevated   - Kidney function test: Returned to baseline after an increase   - Glucose test: Elevated    Imaging   - CT of abdomen and pelvis: Nonobstructive right kidney stone of 5 mm   ED to Hosp-Admission (Discharged) with Lawrence Kenney MD; Tavares Montero MD (05/04/2025)            Assessment and Plan        Hyperlipidemia, unspecified hyperlipidemia type       Orders:    CBC & Differential    Comprehensive Metabolic Panel    Lipid Panel    Hypothyroidism, unspecified type    Orders:    CBC & Differential    Comprehensive Metabolic Panel    Lipid Panel    Healthcare maintenance    Orders:    CBC & Differential    Comprehensive Metabolic Panel    Lipid Panel    Hospital discharge follow-up    Orders:    CBC & Differential    Comprehensive Metabolic Panel    Lipid Panel    Anxiety    Orders:    CBC & Differential    Comprehensive Metabolic Panel    Lipid Panel       Assessment & Plan      1. Hyperlipidemia.  - AST and ALT levels were slightly elevated.  - BUN levels were elevated, likely due to depletion.  - Comprehensive metabolic panel (CMP) and lipid panel will be conducted to monitor her condition.  - CBC, CMP, and lipid panel ordered for follow-up.    2. Nonobstructive right kidney stone.  - CT scan of the abdomen and pelvis revealed a nonobstructive right kidney stone measuring 5 mm.  - History of kidney stones and has undergone lithotripsy twice in the past.  - Advised to stay hydrated and monitor for any symptoms of kidney stone movement.  - No immediate intervention required unless symptoms of obstruction occur.    3. Elevated glucose levels.  - Glucose levels were elevated during her last visit.  - Recheck of  glucose levels will be included in the upcoming lab work.  - CBC, CMP, and lipid panel ordered for follow-up.    4. Medication management.  - Reports that hydroxyzine has been effective for her symptoms.  - Does not need a refill immediately but will request one if needed, especially for her upcoming cruise.  - Advised to take electrolyte supplements daily and stay hydrated, especially during the cruise.         I spent 30 minutes caring for Brianne on this date of service. This time includes time spent by me in the following activities:preparing for the visit, reviewing tests, obtaining and/or reviewing a separately obtained history, performing a medically appropriate examination and/or evaluation , counseling and educating the patient/family/caregiver, ordering medications, tests, or procedures, referring and communicating with other health care professionals , documenting information in the medical record, independently interpreting results and communicating that information with the patient/family/caregiver, and care coordination  Follow Up     Return in about 6 months (around 12/26/2025) for Recheck.  Patient was given instructions and counseling regarding her condition or for health maintenance advice. Please see specific information pulled into the AVS if appropriate.     Patient or patient representative verbalized consent for the use of Ambient Listening during the visit with  MARCOS Elder for chart documentation. 6/26/2025  09:29 EDT

## 2025-06-27 LAB
ALBUMIN SERPL-MCNC: 4.6 G/DL (ref 3.5–5.2)
ALBUMIN/GLOB SERPL: 1.7 G/DL
ALP SERPL-CCNC: 57 U/L (ref 39–117)
ALT SERPL-CCNC: 20 U/L (ref 1–33)
AST SERPL-CCNC: 21 U/L (ref 1–32)
BASOPHILS # BLD AUTO: 0.07 10*3/MM3 (ref 0–0.2)
BASOPHILS NFR BLD AUTO: 1.1 % (ref 0–1.5)
BILIRUB SERPL-MCNC: 0.3 MG/DL (ref 0–1.2)
BUN SERPL-MCNC: 8 MG/DL (ref 6–20)
BUN/CREAT SERPL: 9.5 (ref 7–25)
CALCIUM SERPL-MCNC: 9.4 MG/DL (ref 8.6–10.5)
CHLORIDE SERPL-SCNC: 101 MMOL/L (ref 98–107)
CHOLEST SERPL-MCNC: 199 MG/DL (ref 0–200)
CO2 SERPL-SCNC: 24 MMOL/L (ref 22–29)
CREAT SERPL-MCNC: 0.84 MG/DL (ref 0.57–1)
EGFRCR SERPLBLD CKD-EPI 2021: 88.6 ML/MIN/1.73
EOSINOPHIL # BLD AUTO: 0.25 10*3/MM3 (ref 0–0.4)
EOSINOPHIL NFR BLD AUTO: 4 % (ref 0.3–6.2)
ERYTHROCYTE [DISTWIDTH] IN BLOOD BY AUTOMATED COUNT: 12 % (ref 12.3–15.4)
GLOBULIN SER CALC-MCNC: 2.7 GM/DL
GLUCOSE SERPL-MCNC: 79 MG/DL (ref 65–99)
HCT VFR BLD AUTO: 40.5 % (ref 34–46.6)
HDLC SERPL-MCNC: 49 MG/DL (ref 40–60)
HGB BLD-MCNC: 13.2 G/DL (ref 12–15.9)
IMM GRANULOCYTES # BLD AUTO: 0.02 10*3/MM3 (ref 0–0.05)
IMM GRANULOCYTES NFR BLD AUTO: 0.3 % (ref 0–0.5)
LDLC SERPL CALC-MCNC: 124 MG/DL (ref 0–100)
LYMPHOCYTES # BLD AUTO: 2.09 10*3/MM3 (ref 0.7–3.1)
LYMPHOCYTES NFR BLD AUTO: 33.3 % (ref 19.6–45.3)
MCH RBC QN AUTO: 31.9 PG (ref 26.6–33)
MCHC RBC AUTO-ENTMCNC: 32.6 G/DL (ref 31.5–35.7)
MCV RBC AUTO: 97.8 FL (ref 79–97)
MONOCYTES # BLD AUTO: 0.42 10*3/MM3 (ref 0.1–0.9)
MONOCYTES NFR BLD AUTO: 6.7 % (ref 5–12)
NEUTROPHILS # BLD AUTO: 3.42 10*3/MM3 (ref 1.7–7)
NEUTROPHILS NFR BLD AUTO: 54.6 % (ref 42.7–76)
NRBC BLD AUTO-RTO: 0 /100 WBC (ref 0–0.2)
PLATELET # BLD AUTO: 330 10*3/MM3 (ref 140–450)
POTASSIUM SERPL-SCNC: 4.6 MMOL/L (ref 3.5–5.2)
PROT SERPL-MCNC: 7.3 G/DL (ref 6–8.5)
RBC # BLD AUTO: 4.14 10*6/MM3 (ref 3.77–5.28)
SODIUM SERPL-SCNC: 136 MMOL/L (ref 136–145)
TRIGL SERPL-MCNC: 147 MG/DL (ref 0–150)
VLDLC SERPL CALC-MCNC: 26 MG/DL (ref 5–40)
WBC # BLD AUTO: 6.27 10*3/MM3 (ref 3.4–10.8)

## 2025-07-16 RX ORDER — HYDROXYZINE HYDROCHLORIDE 25 MG/1
25 TABLET, FILM COATED ORAL EVERY 8 HOURS PRN
Qty: 60 TABLET | Refills: 1 | Status: SHIPPED | OUTPATIENT
Start: 2025-07-16 | End: 2025-08-15

## 2025-07-18 DIAGNOSIS — E03.9 ACQUIRED HYPOTHYROIDISM: ICD-10-CM

## 2025-07-18 RX ORDER — LEVOTHYROXINE SODIUM 50 UG/1
50 TABLET ORAL DAILY
Qty: 90 TABLET | Refills: 1 | Status: SHIPPED | OUTPATIENT
Start: 2025-07-18

## 2025-07-18 NOTE — TELEPHONE ENCOUNTER
Rx Refill Note  Requested Prescriptions     Pending Prescriptions Disp Refills    levothyroxine (SYNTHROID, LEVOTHROID) 50 MCG tablet [Pharmacy Med Name: LEVOTHYROXINE 0.05MG (50MCG) TAB] 90 tablet 1     Sig: TAKE 1 TABLET BY MOUTH DAILY      Last office visit with prescribing clinician: 6/26/2025   Last telemedicine visit with prescribing clinician: 1/28/2025   Next office visit with prescribing clinician: 1/28/2026   {    Oneal Khan CMA  07/18/25, 15:54 EDT

## 2025-08-04 RX ORDER — PANTOPRAZOLE SODIUM 40 MG/1
40 TABLET, DELAYED RELEASE ORAL DAILY
Qty: 90 TABLET | Refills: 3 | Status: SHIPPED | OUTPATIENT
Start: 2025-08-04

## (undated) DEVICE — STRAP STIRUP WO/ RNG

## (undated) DEVICE — GLV SURG BIOGEL LTX PF 7 1/2

## (undated) DEVICE — LOU D & C HYSTEROSCOPY: Brand: MEDLINE INDUSTRIES, INC.

## (undated) DEVICE — TBG W FLTR FOR BERKELEY SYSTEM

## (undated) DEVICE — SACK GZ PERM

## (undated) DEVICE — Device

## (undated) DEVICE — CANSTR SXN UTER NO FLTR SURG

## (undated) DEVICE — NEEDLE, QUINCKE 22GX3.5": Brand: MEDLINE INDUSTRIES, INC.

## (undated) DEVICE — HOSE BT TO BT VAC CURETTAGE SNGL PT USE EA/10

## (undated) DEVICE — ST COL BERKELY TBG

## (undated) DEVICE — SYR CONTRL PRESS/LO FIX/M/LL W/THMB/RNG 10ML

## (undated) DEVICE — GLV SURG SIGNATURE ESSENTIAL PF LTX SZ7